# Patient Record
Sex: MALE | Race: WHITE | NOT HISPANIC OR LATINO | URBAN - METROPOLITAN AREA
[De-identification: names, ages, dates, MRNs, and addresses within clinical notes are randomized per-mention and may not be internally consistent; named-entity substitution may affect disease eponyms.]

---

## 2021-05-03 ENCOUNTER — EMERGENCY (EMERGENCY)
Facility: HOSPITAL | Age: 61
LOS: 0 days | Discharge: HOME | End: 2021-05-04
Attending: EMERGENCY MEDICINE | Admitting: EMERGENCY MEDICINE
Payer: COMMERCIAL

## 2021-05-03 VITALS
DIASTOLIC BLOOD PRESSURE: 81 MMHG | HEART RATE: 77 BPM | RESPIRATION RATE: 20 BRPM | OXYGEN SATURATION: 98 % | TEMPERATURE: 99 F | SYSTOLIC BLOOD PRESSURE: 131 MMHG

## 2021-05-03 DIAGNOSIS — R53.1 WEAKNESS: ICD-10-CM

## 2021-05-03 DIAGNOSIS — M19.90 UNSPECIFIED OSTEOARTHRITIS, UNSPECIFIED SITE: ICD-10-CM

## 2021-05-03 DIAGNOSIS — R20.2 PARESTHESIA OF SKIN: ICD-10-CM

## 2021-05-03 DIAGNOSIS — K21.9 GASTRO-ESOPHAGEAL REFLUX DISEASE WITHOUT ESOPHAGITIS: ICD-10-CM

## 2021-05-03 DIAGNOSIS — R47.82 FLUENCY DISORDER IN CONDITIONS CLASSIFIED ELSEWHERE: ICD-10-CM

## 2021-05-03 DIAGNOSIS — I10 ESSENTIAL (PRIMARY) HYPERTENSION: ICD-10-CM

## 2021-05-03 DIAGNOSIS — R42 DIZZINESS AND GIDDINESS: ICD-10-CM

## 2021-05-03 DIAGNOSIS — R55 SYNCOPE AND COLLAPSE: ICD-10-CM

## 2021-05-03 DIAGNOSIS — J98.4 OTHER DISORDERS OF LUNG: ICD-10-CM

## 2021-05-03 LAB
ALBUMIN SERPL ELPH-MCNC: 4.4 G/DL — SIGNIFICANT CHANGE UP (ref 3.5–5.2)
ALP SERPL-CCNC: 80 U/L — SIGNIFICANT CHANGE UP (ref 30–115)
ALT FLD-CCNC: 15 U/L — SIGNIFICANT CHANGE UP (ref 0–41)
ANION GAP SERPL CALC-SCNC: 8 MMOL/L — SIGNIFICANT CHANGE UP (ref 7–14)
APTT BLD: 31.2 SEC — SIGNIFICANT CHANGE UP (ref 27–39.2)
AST SERPL-CCNC: 22 U/L — SIGNIFICANT CHANGE UP (ref 0–41)
BASOPHILS # BLD AUTO: 0.03 K/UL — SIGNIFICANT CHANGE UP (ref 0–0.2)
BASOPHILS NFR BLD AUTO: 0.5 % — SIGNIFICANT CHANGE UP (ref 0–1)
BILIRUB SERPL-MCNC: 0.4 MG/DL — SIGNIFICANT CHANGE UP (ref 0.2–1.2)
BUN SERPL-MCNC: 17 MG/DL — SIGNIFICANT CHANGE UP (ref 10–20)
CALCIUM SERPL-MCNC: 9.6 MG/DL — SIGNIFICANT CHANGE UP (ref 8.5–10.1)
CHLORIDE SERPL-SCNC: 103 MMOL/L — SIGNIFICANT CHANGE UP (ref 98–110)
CO2 SERPL-SCNC: 26 MMOL/L — SIGNIFICANT CHANGE UP (ref 17–32)
CREAT SERPL-MCNC: 0.9 MG/DL — SIGNIFICANT CHANGE UP (ref 0.7–1.5)
EOSINOPHIL # BLD AUTO: 0.08 K/UL — SIGNIFICANT CHANGE UP (ref 0–0.7)
EOSINOPHIL NFR BLD AUTO: 1.2 % — SIGNIFICANT CHANGE UP (ref 0–8)
GLUCOSE SERPL-MCNC: 94 MG/DL — SIGNIFICANT CHANGE UP (ref 70–99)
HCT VFR BLD CALC: 39.6 % — LOW (ref 42–52)
HGB BLD-MCNC: 13.3 G/DL — LOW (ref 14–18)
IMM GRANULOCYTES NFR BLD AUTO: 0.2 % — SIGNIFICANT CHANGE UP (ref 0.1–0.3)
INR BLD: 1.01 RATIO — SIGNIFICANT CHANGE UP (ref 0.65–1.3)
LYMPHOCYTES # BLD AUTO: 1.83 K/UL — SIGNIFICANT CHANGE UP (ref 1.2–3.4)
LYMPHOCYTES # BLD AUTO: 27.9 % — SIGNIFICANT CHANGE UP (ref 20.5–51.1)
MAGNESIUM SERPL-MCNC: 2 MG/DL — SIGNIFICANT CHANGE UP (ref 1.8–2.4)
MCHC RBC-ENTMCNC: 31.2 PG — HIGH (ref 27–31)
MCHC RBC-ENTMCNC: 33.6 G/DL — SIGNIFICANT CHANGE UP (ref 32–37)
MCV RBC AUTO: 93 FL — SIGNIFICANT CHANGE UP (ref 80–94)
MONOCYTES # BLD AUTO: 0.71 K/UL — HIGH (ref 0.1–0.6)
MONOCYTES NFR BLD AUTO: 10.8 % — HIGH (ref 1.7–9.3)
NEUTROPHILS # BLD AUTO: 3.89 K/UL — SIGNIFICANT CHANGE UP (ref 1.4–6.5)
NEUTROPHILS NFR BLD AUTO: 59.4 % — SIGNIFICANT CHANGE UP (ref 42.2–75.2)
NRBC # BLD: 0 /100 WBCS — SIGNIFICANT CHANGE UP (ref 0–0)
PLATELET # BLD AUTO: 226 K/UL — SIGNIFICANT CHANGE UP (ref 130–400)
POTASSIUM SERPL-MCNC: 4.7 MMOL/L — SIGNIFICANT CHANGE UP (ref 3.5–5)
POTASSIUM SERPL-SCNC: 4.7 MMOL/L — SIGNIFICANT CHANGE UP (ref 3.5–5)
PROT SERPL-MCNC: 7.1 G/DL — SIGNIFICANT CHANGE UP (ref 6–8)
PROTHROM AB SERPL-ACNC: 11.6 SEC — SIGNIFICANT CHANGE UP (ref 9.95–12.87)
RBC # BLD: 4.26 M/UL — LOW (ref 4.7–6.1)
RBC # FLD: 11.5 % — SIGNIFICANT CHANGE UP (ref 11.5–14.5)
SODIUM SERPL-SCNC: 137 MMOL/L — SIGNIFICANT CHANGE UP (ref 135–146)
TROPONIN T SERPL-MCNC: <0.01 NG/ML — SIGNIFICANT CHANGE UP
WBC # BLD: 6.55 K/UL — SIGNIFICANT CHANGE UP (ref 4.8–10.8)
WBC # FLD AUTO: 6.55 K/UL — SIGNIFICANT CHANGE UP (ref 4.8–10.8)

## 2021-05-03 PROCEDURE — 99220: CPT

## 2021-05-03 PROCEDURE — 70496 CT ANGIOGRAPHY HEAD: CPT | Mod: 26,MA

## 2021-05-03 PROCEDURE — 93010 ELECTROCARDIOGRAM REPORT: CPT

## 2021-05-03 PROCEDURE — 70450 CT HEAD/BRAIN W/O DYE: CPT | Mod: 26,59,MA

## 2021-05-03 PROCEDURE — 70498 CT ANGIOGRAPHY NECK: CPT | Mod: 26,MA

## 2021-05-03 RX ORDER — METOCLOPRAMIDE HCL 10 MG
10 TABLET ORAL ONCE
Refills: 0 | Status: COMPLETED | OUTPATIENT
Start: 2021-05-03 | End: 2021-05-03

## 2021-05-03 RX ORDER — SODIUM CHLORIDE 9 MG/ML
1000 INJECTION, SOLUTION INTRAVENOUS ONCE
Refills: 0 | Status: COMPLETED | OUTPATIENT
Start: 2021-05-03 | End: 2021-05-03

## 2021-05-03 RX ADMIN — SODIUM CHLORIDE 1000 MILLILITER(S): 9 INJECTION, SOLUTION INTRAVENOUS at 18:55

## 2021-05-03 RX ADMIN — Medication 10 MILLIGRAM(S): at 22:37

## 2021-05-03 NOTE — ED PROVIDER NOTE - CARE PLAN
Principal Discharge DX:	Lightheadedness   Principal Discharge DX:	Lightheadedness  Secondary Diagnosis:	Syncope  Secondary Diagnosis:	Paresthesia  Secondary Diagnosis:	Fluency disorder associated with underlying disease

## 2021-05-03 NOTE — ED CDU PROVIDER INITIAL DAY NOTE - PHYSICAL EXAMINATION
PHYSICAL EXAM:    GENERAL: Alert, appears stated age, well appearing, non-toxic  SKIN: Warm, pink and dry. MMM.   HEAD: NC  EYE: Normal lids/conjunctiva  ENT: Normal hearing, patent oropharynx  NECK: +supple. No meningismus, or JVD.   Pulm: Bilateral BS, normal resp effort, no wheezes, stridor, or retractions  CV: RRR, no M/R/G, 2+and = radial pulses  Abd: soft, non-tender, non-distended  Mskel: no erythema, cyanosis, edema. no calf tenderness  Neuro: AAOx3, no sensory/motor deficits, CN 2-12 intact. No speech slurring, pronator drift, facial asymmetry. normal finger-to-nose b/l. 5/5 strength throughout. normal gait.

## 2021-05-03 NOTE — ED CDU PROVIDER INITIAL DAY NOTE - OBJECTIVE STATEMENT
59 y/o M with PMH HTN, chronic lung disease from 9/11, vertigo, OA placed in OBS under TIA protocol.   He presents with global weakness, constant moderate lightheadedness x 5 days. no palliating/provoking factors. at onset 5 days ago had difficulty speaking, chest pressure, tingling fingers, and feeling pre-syncopal no LOC.  Saw his doctor who just increased his lisinopril from 10 to 15 mg. +similar episode in past.   Has had worsening concentration/focus, worsening cognitive abilities (can't recall people he just talked to without a lot of prompting, can't figure things out he normally would quickly in past).  Meds Lisinopril 15 mg, Nexium  PMD Ribiero.

## 2021-05-03 NOTE — ED PROVIDER NOTE - ATTENDING CONTRIBUTION TO CARE
59 yo male with pmh of HTN, vertigo, chronic lung dz from 9/11, GERD, arthritis, right hip replacement, left shoulder surgery presents to the ER weakness and dizziness/lightheadedness since Thursday (5 days PTA). States on Thur he had an episode where he felt like he dazed out while emptying out some bottles, felt very lightheaded, tingling of hands/fingers, difficulty speaking, chest pressure and felt like he was going to pass out onto ground. However he never fell and episode passed. Saw his doctor who just increased his lisinopril from 10 to 15 mg. Two months prior he had he was feeling lightheaded, stood up and passed out hitting face against ground. Has had worsening concentration/focus, worsening cognitive abilities (can't recall people he just talked to without a lot of prompting, can't figure things out he normally would quickly in past), +nausea, but denies vomiting/diarrhea/fever/cough/SOB/abdomen pain/HA/leg pain or swelling/dysuria/hematuria/rectal bleeding (but in past did notice blood in stools).   Exam NCAT, EOMI, no facial droop, PERRL, no cspine tenderness, Lungs CTAB, Heart regular S1S2 Abdomen soft nt/nd +BS, no mass, Ext no edema or calf tenderness, No joint deformity, Neuro normal motor/sensory today, slightly off balance when walking, normal CN exam. no facial droop/slurred speech. Alert and oriented x 3.   Will check labs, ekg, xray, CT head, CTA head/neck.     ALL: nkda  PMH as above  Meds Lisinopril 15 mg, Nexium, Tylenol  SH denies smoking  PMD Enio

## 2021-05-03 NOTE — ED ADULT NURSE NOTE - OBJECTIVE STATEMENT
Pt. and daughter reports episode of blacking out and feeling dizzy and weak last week. He states he has not been the same since then and is forgetful. Denies cp, sob, n/v/d, fevers, chills.

## 2021-05-03 NOTE — ED PROVIDER NOTE - CLINICAL SUMMARY MEDICAL DECISION MAKING FREE TEXT BOX
61 yo male with pmh of HTN, vertigo, chronic lung dz from 9/11, GERD, arthritis, right hip replacement, left shoulder surgery presents to the ER weakness and dizziness/lightheadedness since Thursday (5 days PTA). States on Thur he had an episode where he felt like he dazed out while emptying out some bottles, felt very lightheaded, tingling of hands/fingers, difficulty speaking, chest pressure and felt like he was going to pass out onto ground. However he never fell and episode passed. Saw his doctor who just increased his lisinopril from 10 to 15 mg. Two months prior he had he was feeling lightheaded, stood up and passed out hitting face against ground. Has had worsening concentration/focus, worsening cognitive abilities (can't recall people he just talked to without a lot of prompting, can't figure things out he normally would quickly in past), +nausea, but denies vomiting/diarrhea/fever/cough/SOB/abdomen pain/HA/leg pain or swelling/dysuria/hematuria/rectal bleeding (but in past did notice blood in stools).   Exam NCAT, EOMI, no facial droop, PERRL, no cspine tenderness, Lungs CTAB, Heart regular S1S2 Abdomen soft nt/nd +BS, no mass, Ext no edema or calf tenderness, No joint deformity, Neuro normal motor/sensory today, slightly off balance when walking, normal CN exam. no facial droop/slurred speech. Alert and oriented x 3.  Pt needs neuro/cardio evaluation. Neuro consulted in the ER, recommend OBS for MRI as TIA workup. Also given his syncopal episode recommend ECHO heart while getting workup.

## 2021-05-03 NOTE — ED PROVIDER NOTE - NS ED ROS FT
Review of Systems:  	•	CONSTITUTIONAL: no fever, no diaphoresis, no chills  	•	SKIN: no rash  	•	EYES: no eye pain, no blurry vision  	•	ENT: no change in hearing, no tinnitus   	•	RESPIRATORY: no shortness of breath, no cough  	•	CARDIAC: no chest pain, no palpitations  	•	GI: no abd pain, +nausea, no vomiting, no diarrhea  	•	GENITO-URINARY: no discharge, no dysuria; no hematuria, no increased urinary frequency  	•	MUSCULOSKELETAL: no joint paint, no swelling, no redness  	•	NEUROLOGIC: +generalized weakness, +headache. no paresthesias/numbness/loc  	•	PSYCH: no anxiety, non suicidal, non homicidal, no hallucination, no depression

## 2021-05-03 NOTE — ED PROVIDER NOTE - PROGRESS NOTE DETAILS
neo: neuro aware, spoke with keeley VANEGAS, who will evaluate patient in ED. pending CT results. per neuro- obs for MRI

## 2021-05-03 NOTE — ED PROVIDER NOTE - PHYSICAL EXAMINATION
CONSTITUTIONAL: Well-developed; well-nourished; in no acute distress, nontoxic appearing  SKIN: skin exam is warm and dry  HEAD: Normocephalic; atraumatic.  EYES: PERRL, 3 mm bilateral, no nystagmus, EOM intact; conjunctiva and sclera clear.  ENT: MMM  NECK: ROM intact.  CARD: S1, S2 normal, no murmur  RESP: No wheezes, rales or rhonchi. Good air movement bilaterally  ABD: soft; non-distended; non-tender. No Rebound, No guarding  EXT: Normal ROM.   NEURO: awake, alert, following commands, oriented, grossly unremarkable. No Focal deficits. GCS 15. Negative pronator drift. CN 2-12 intact.  PSYCH: Cooperative, appropriate.

## 2021-05-03 NOTE — ED PROVIDER NOTE - OBJECTIVE STATEMENT
60 year old male, past medical history HTN, GERD, who presents with lightheadedness. patient reports feeling lightheadedness, presyncopal symptoms that began x4 days ago, patient also endorses episode of inability finding words, palpitations and paresthesias that occurred x3 days ago, episode lasting ~10 minutes. Patient reports since incident has been feeling lightheadedness, unsteady gait. denies vision changes, neck pain, chest pain, SOB, abd pain, vomiting, diarrhea, urinary symptoms, skin changes. denies hx similar.

## 2021-05-04 VITALS
SYSTOLIC BLOOD PRESSURE: 146 MMHG | OXYGEN SATURATION: 99 % | DIASTOLIC BLOOD PRESSURE: 87 MMHG | HEART RATE: 71 BPM | RESPIRATION RATE: 18 BRPM | TEMPERATURE: 98 F

## 2021-05-04 LAB
SARS-COV-2 RNA SPEC QL NAA+PROBE: SIGNIFICANT CHANGE UP
TROPONIN T SERPL-MCNC: <0.01 NG/ML — SIGNIFICANT CHANGE UP

## 2021-05-04 PROCEDURE — 93010 ELECTROCARDIOGRAM REPORT: CPT

## 2021-05-04 PROCEDURE — 93306 TTE W/DOPPLER COMPLETE: CPT | Mod: 26

## 2021-05-04 PROCEDURE — 70551 MRI BRAIN STEM W/O DYE: CPT | Mod: 26,MA

## 2021-05-04 PROCEDURE — 99283 EMERGENCY DEPT VISIT LOW MDM: CPT

## 2021-05-04 PROCEDURE — 99217: CPT

## 2021-05-04 NOTE — CONSULT NOTE ADULT - SUBJECTIVE AND OBJECTIVE BOX
Stroke Consult Note:    JEFF GOODWIN    1. Chief Complaint: lightheadedness /unsteady gait    HPI: 59 y/o M with PMH HTN, chronic lung disease from ,  OA presents after 5 day history of episodic dizziness lightheadedness and unsteady gait. On thursday he said episode of  , BL UE weakness and difficulty expressing himself which lasted for about 10 minutes and resolved. EMS were called but patient refused to come to the hospital. After that event patient has been complaining of lightheadidness and unsteady gait and episodes of "spacing out" as per daughter.  On exam he has diminished sensation on the right side, other wise non focal. CTH and CTA were done in ED which are unremarkable.     	    2. Relevant PMH:   Prior ischemic stroke/TIA[ ], Afib [ ], CAD [ ], HTN [ x], DLD [ ], DM [ ], PVD [ ], Obesity [ ],   Sedentary lifestyle [ ], CHF [ ], IMAN [ ], Cancer Hx [ ].    3. Social History: Smoking [ ], Drug Use [ ], Alcohol Use [ ], Other [ ]    4. Possible Location of Stroke:    5. Relevant Brain Tissue Imagin. Relevant Cerebrovascular Imaging:   CT Angio Neck w/ IV Cont:   EXAM:  CT ANGIO NECK (W)AW IC        EXAM:  CT ANGIO BRAIN (W)AW IC            PROCEDURE DATE:  2021            INTERPRETATION:  Clinical History / Reason for exam: Weakness/confusion    Technique/CT Angiogram of the head and neck: Axial contiguous images were obtained of the head and neck following the intravenous administration of contrast. Reformatted images in the coronal and sagittal planes were acquired, as well as 3DMIP reconstructed images.    Comparison:    Findings:    NECK:    The visualized aortic arch and great vessel origins are patent. There is no stenosis at the origin of the right brachiocephalic, right and left common carotid, left subclavian, and right and left vertebral arteries. The left common carotid artery arises from a common trunk with the right brachiocephalic artery (anatomic variation).    The right and left common, internal and external carotid arteries are patent. There is mild calcification but no stenosis at the common carotid artery bifurcations.There is no evidence of significant stenosis or occlusion of the common carotid arteries, the carotid artery bifurcations, or along the course of the distal cervical portions of the right and left internal carotid arteries.    Normal branching pattern is noted of the bilateral external carotid arteries.    The vertebral arteries are patent.    HEAD:    The distal right and left internal carotid arteries are patent without calcification or stenosis in the region of the cavernous and supraclinoid portions of the ICAs.    The ophthalmic arteries are patent.    There is normal contrast filling of the middle cerebral arteries and the left anterior cerebral artery.  The A1 segment of the right anterior cerebral artery is hypoplastic with both distal anterior cerebral arteries are normally; the anterior communicating artery is patent (anatomic variation).  No evidence of stenosis, occlusion or aneurysm.    The distal vertebral arteries are patent.The basilar artery is patent. There is normal filling of the PCAs, SCAs, PICAs and AICAs bilaterally. No evidence of stenosis, occlusion or aneurysm.      No venous abnormalities are noted. No evidence of filling defects within the venous sinuses.    OTHER: The lung apices are clear.    IMPRESSION:    Negative CTA of the head and neck  No evidence of major vascular stenosis, occlusion, or aneurysm.        MALIHA VALDEZ MD; Attending Interventional Radiologist  This document has been electronically signed. May  3 2021 10:57PM (21 @ 22:07)     CT Angio Head w/ IV Cont:   EXAM:  CT ANGIO NECK (W)AW IC        EXAM:  CT ANGIO BRAIN (W)AW IC            PROCEDURE DATE:  2021            INTERPRETATION:  Clinical History / Reason for exam: Weakness/confusion    Technique/CT Angiogram of the head and neck: Axial contiguous images were obtained of the head and neck following the intravenous administration of contrast. Reformatted images in the coronal and sagittal planes were acquired, as well as 3DMIP reconstructed images.    Comparison:    Findings:    NECK:    The visualized aortic arch and great vessel origins are patent. There is no stenosis at the origin of the right brachiocephalic, right and left common carotid, left subclavian, and right and left vertebral arteries. The left common carotid artery arises from a common trunk with the right brachiocephalic artery (anatomic variation).    The right and left common, internal and external carotid arteries are patent. There is mild calcification but no stenosis at the common carotid artery bifurcations.There is no evidence of significant stenosis or occlusion of the common carotid arteries, the carotid artery bifurcations, or along the course of the distal cervical portions of the right and left internal carotid arteries.    Normal branching pattern is noted of the bilateral external carotid arteries.    The vertebral arteries are patent.    HEAD:    The distal right and left internal carotid arteries are patent without calcification or stenosis in the region of the cavernous and supraclinoid portions of the ICAs.    The ophthalmic arteries are patent.    There is normal contrast filling of the middle cerebral arteries and the left anterior cerebral artery.  The A1 segment of the right anterior cerebral artery is hypoplastic with both distal anterior cerebral arteries are normally; the anterior communicating artery is patent (anatomic variation).  No evidence of stenosis, occlusion or aneurysm.    The distal vertebral arteries are patent.The basilar artery is patent. There is normal filling of the PCAs, SCAs, PICAs and AICAs bilaterally. No evidence of stenosis, occlusion or aneurysm.      No venous abnormalities are noted. No evidence of filling defects within the venous sinuses.    OTHER: The lung apices are clear.    IMPRESSION:    Negative CTA of the head and neck  No evidence of major vascular stenosis, occlusion, or aneurysm.              MALIHA VALDEZ MD; Attending Interventional Radiologist  This document has been electronically signed. May  3 2021 10:57PM (21 @ 22:06)            7. Relevant blood tests:      8. Relevant cardiac rhythm monitorin. Relevant Cardiac Structure: (TTE/BARBARA +/-):[ ]No intracardiac thrombus/[ ] no vegetation/[ ]no [place]/EF:    Home Medications:      MEDICATIONS  (STANDING):      10. PT/OT/Speech/Rehab/S&Sw/ Cognitive eval results and recommendations:    11. Exam:    Vital Signs Last 24 Hrs  T(C): 37.1 (03 May 2021 18:07), Max: 37.1 (03 May 2021 18:07)  T(F): 98.7 (03 May 2021 18:07), Max: 98.7 (03 May 2021 18:07)  HR: 69 (04 May 2021 02:43) (62 - 77)  BP: 182/86 (04 May 2021 02:43) (131/81 - 182/86)  BP(mean): --  RR: 18 (04 May 2021 02:43) (18 - 20)  SpO2: 100% (04 May 2021 02:43) (98% - 100%)    12.   NIH STROKE SCALE  Item	                                                        Score  1 a.	Level of Consciousness	               	0  1 b. LOC Questions	                                0  1 c.	LOC Commands	                               	0  2.	Best Gaze	                                        0  3.	Visual	                                                0  4.	Facial Palsy	                                        0  5 a.	Motor Arm - Left	                                0  5 b.	Motor Arm - Right	                        0  6 a.	Motor Leg - Left	                                0  6 b.	Motor Leg - Right	                                0  7.	Limb Ataxia	                                        0  8.	Sensory	                                                1  9.	Language	                                        0  10.	Dysarthria	                                        0  11.	Extinction and Inattention  	        0  ______________________________________  TOTAL	                                                        0    Total NIHSS on admission:      NIHSS yesterday:      NIHSS today: 1    mRS:  0 No symptoms at all  1 No significant disability despite symptoms; able to carry out all usual duties and activities without assistance  2 Slight disability; unable to carry out all previous activities, but able to look after own affairs  3 Moderate disability; requiring some help, but able to walk without assistance  4 Moderately severe disability; unable to walk without assistance and unable to attend to own bodily needs without assistance  5 Severe disability; bedridden, incontinent and requiring constant nursing care and attention  6 Dead      13. Impression: 59 y/o M with PMH HTN, chronic lung disease from ,  OA presents after 5 day history of episodic dizziness lightheadedness and unsteady gait. On thursday he said episode of  , BL UE weakness and difficulty expressing himself which lasted for about 10 minutes and resolved. EMS were called but patient refused to come to the hospital. After that event patient has been complaining of lightheadedness and unsteady gait and episodes of "spacing out" as per daughter.  On exam he has diminished sensation on the right side, other wise non focal. CTH and CTA were done in ED which are unremarkable. Labs are unremarkable.  SS could be related to TIA vs seizure vs presyncopy..      15. Suggestions:   MRI brain NC  Echo  Telemetry        Neuroattending note will follow

## 2021-05-04 NOTE — ED CDU PROVIDER DISPOSITION NOTE - CLINICAL COURSE
59 Y/O M HTN, RECENTLY STARTED ON LISINOPRIL BY PMD WITH 2 EPISODES OF NEAR SYNCOPE ASSOCIATED WITH PARESTHESIAS AND DIFFICULTY SPEAKING. INITIAL ED EVALUATION REVIEWED. CT SCANS REVIEWED. NEUROLOGY CONSULT REVIEWED. PT WITH NO COMPLAINTS. NO DIZZINESS, CP, SOB. NO HA N/V. EXAM NORMAL. MRI BRAIN AND EEG NORMAL. PT TO FOLLOW UP WITH NEUROLOGY AND PMD AS AN OUTPT. PT GIVEN STRICT RETURN INSTRUCTIONS.

## 2021-05-04 NOTE — ED CDU PROVIDER DISPOSITION NOTE - NSFOLLOWUPINSTRUCTIONS_ED_ALL_ED_FT
Follow up with your PMD in 1 week  Continue all your home medications  Return to ED if your symptoms worsen/return

## 2021-05-04 NOTE — ED CDU PROVIDER DISPOSITION NOTE - CARE PROVIDER_API CALL
CONSUELO VARGAS  [id number]  [address], Phys,    Phone: [phone]  Fax: [phone]  Follow Up Time: 4-6 Days

## 2021-05-04 NOTE — ED CDU PROVIDER SUBSEQUENT DAY NOTE - MEDICAL DECISION MAKING DETAILS
59 Y/O M HTN, RECENTLY STARTED ON LISINOPRIL BY PMD WITH 2 EPISODES OF NEAR SYNCOPE ASSOCIATED WITH PARESTHESIAS AND DIFFICULTY SPEAKING. INITIAL ED EVALUATION REVIEWED. CT SCANS REVIEWED. NEUROLOGY CONSULT REVIEWED. PT AWAITING MRI BRAIN AND ECHO THIS AM. PT WITH NO COMPLAINTS. NO DIZZINESS, CP, SOB. NO HA N/V. EXAM NORMAL. WILL REASSESS.

## 2021-05-04 NOTE — ED CDU PROVIDER DISPOSITION NOTE - NSFOLLOWUPCLINICS_GEN_ALL_ED_FT
[facility]  Neurology  [address] [place] [address], [address]  [place] [address]  Phone: [phone]  Fax:

## 2021-05-04 NOTE — CONSULT NOTE ADULT - ATTENDING COMMENTS
Patient seen and examined and agree with above except as noted.  Patients history, imaging, vitals, meds and notes reviewed personally.  Patient had multiple episodes over the last few weeks of syncope some associated with coughing violently and some with positional changes, including waking up in night to use bathroom and passing out waking up on floor.  This latest episode also is suspicious for orthostatic hypotensive episode.  He also has some findings on exam suspicious for neuropathy with absent vibration in patellar and ankle and decreased LT, Temp in LE b/l.  Unclear etiology for neuropathy but in setting of possible vasovagal and orthostatic events may suggest autonomic instability.    Plan   1. Send SPEP and UPEP both with immunofixation, hgba1c, JODI, dsDNA  2. Out patient EMG/NCS  3. Cardiology evaluation for tilt table   4. Neurology follow up as an out patient

## 2021-05-04 NOTE — ED CDU PROVIDER DISPOSITION NOTE - PATIENT PORTAL LINK FT
You can access the FollowMyHealth Patient Portal offered by [facility] by registering at the following website: [email or web address]. By joining [facility]’s FollowMyHealth portal, you will also be able to view your health information using other applications (apps) compatible with our system.

## 2021-05-04 NOTE — ED CDU PROVIDER SUBSEQUENT DAY NOTE - ATTENDING CONTRIBUTION TO CARE
I personally evaluated the patient. I reviewed the Resident’s or Physician Assistant’s note (as assigned above), and agree with the findings and plan except as documented in my note.   59 Y/O M HTN, RECENTLY STARTED ON LISINOPRIL BY PMD WITH 2 EPISODES OF NEAR SYNCOPE ASSOCIATED WITH PARESTHESIAS AND DIFFICULTY SPEAKING. INITIAL ED EVALUATION REVIEWED. CT SCANS REVIEWED. NEUROLOGY CONSULT REVIEWED. PT AWAITING MRI BRAIN AND ECHO THIS AM. PT WITH NO COMPLAINTS. NO DIZZINESS, CP, SOB. NO HA N/V. EXAM NORMAL. WILL REASSESS.

## 2021-05-04 NOTE — ED CDU PROVIDER DISPOSITION NOTE - ATTENDING CONTRIBUTION TO CARE
I personally evaluated the patient. I reviewed the Resident’s or Physician Assistant’s note (as assigned above), and agree with the findings and plan except as documented in my note.   59 Y/O M HTN, RECENTLY STARTED ON LISINOPRIL BY PMD WITH 2 EPISODES OF NEAR SYNCOPE ASSOCIATED WITH PARESTHESIAS AND DIFFICULTY SPEAKING. INITIAL ED EVALUATION REVIEWED. CT SCANS REVIEWED. NEUROLOGY CONSULT REVIEWED. PT WITH NO COMPLAINTS. NO DIZZINESS, CP, SOB. NO HA N/V. EXAM NORMAL. MRI BRAIN AND EEG NORMAL. PT TO FOLLOW UP WITH NEUROLOGY AND PMD AS AN OUTPT. PT GIVEN STRICT RETURN INSTRUCTIONS.

## 2021-05-04 NOTE — ED CDU PROVIDER SUBSEQUENT DAY NOTE - PROGRESS NOTE DETAILS
Pt seen at bedside, NAD. Arrived overnight, received from BHUPENDRA Whitlock presenting under TIA protocol. Pt seen at bedside, NAD. Arrived overnight, received from BHUPENDRA Whitlock presenting under TIA protocol. Pt complained of of weakness, lightheadedness. Also complained of having episode of difficulty speaking/concentrating and other cognitive abilities. Pt was seen by neuro who recommend MRI and ECHO. CTA H/N - negative. Covid Negative. Pt currently awaiting results for ECHO

## 2021-05-04 NOTE — ED ADULT NURSE REASSESSMENT NOTE - NS ED NURSE REASSESS COMMENT FT1
patient placed in observation and moved to 16B.  Patient remains on cardiac monitor. Patient in stable condition and nad.  Report given to NICO Manley.

## 2021-07-31 ENCOUNTER — INPATIENT (INPATIENT)
Facility: HOSPITAL | Age: 61
LOS: 3 days | Discharge: HOME | End: 2021-08-04
Attending: INTERNAL MEDICINE | Admitting: INTERNAL MEDICINE
Payer: COMMERCIAL

## 2021-07-31 VITALS
RESPIRATION RATE: 30 BRPM | WEIGHT: 210.1 LBS | SYSTOLIC BLOOD PRESSURE: 99 MMHG | TEMPERATURE: 98 F | HEIGHT: 75 IN | HEART RATE: 67 BPM | DIASTOLIC BLOOD PRESSURE: 55 MMHG | OXYGEN SATURATION: 98 %

## 2021-07-31 PROBLEM — I10 ESSENTIAL (PRIMARY) HYPERTENSION: Chronic | Status: ACTIVE | Noted: 2021-05-03

## 2021-07-31 LAB
ALBUMIN SERPL ELPH-MCNC: 4.5 G/DL — SIGNIFICANT CHANGE UP (ref 3.5–5.2)
ALP SERPL-CCNC: 90 U/L — SIGNIFICANT CHANGE UP (ref 30–115)
ALT FLD-CCNC: 14 U/L — SIGNIFICANT CHANGE UP (ref 0–41)
ANION GAP SERPL CALC-SCNC: 7 MMOL/L — SIGNIFICANT CHANGE UP (ref 7–14)
APTT BLD: 27.2 SEC — SIGNIFICANT CHANGE UP (ref 27–39.2)
AST SERPL-CCNC: 18 U/L — SIGNIFICANT CHANGE UP (ref 0–41)
BASOPHILS # BLD AUTO: 0.04 K/UL — SIGNIFICANT CHANGE UP (ref 0–0.2)
BASOPHILS NFR BLD AUTO: 0.4 % — SIGNIFICANT CHANGE UP (ref 0–1)
BILIRUB SERPL-MCNC: 0.4 MG/DL — SIGNIFICANT CHANGE UP (ref 0.2–1.2)
BLD GP AB SCN SERPL QL: SIGNIFICANT CHANGE UP
BUN SERPL-MCNC: 19 MG/DL — SIGNIFICANT CHANGE UP (ref 10–20)
CALCIUM SERPL-MCNC: 9.3 MG/DL — SIGNIFICANT CHANGE UP (ref 8.5–10.1)
CHLORIDE SERPL-SCNC: 101 MMOL/L — SIGNIFICANT CHANGE UP (ref 98–110)
CO2 SERPL-SCNC: 29 MMOL/L — SIGNIFICANT CHANGE UP (ref 17–32)
CREAT SERPL-MCNC: 1.1 MG/DL — SIGNIFICANT CHANGE UP (ref 0.7–1.5)
EOSINOPHIL # BLD AUTO: 0.09 K/UL — SIGNIFICANT CHANGE UP (ref 0–0.7)
EOSINOPHIL NFR BLD AUTO: 0.9 % — SIGNIFICANT CHANGE UP (ref 0–8)
GLUCOSE SERPL-MCNC: 113 MG/DL — HIGH (ref 70–99)
HCT VFR BLD CALC: 38.7 % — LOW (ref 42–52)
HGB BLD-MCNC: 12.9 G/DL — LOW (ref 14–18)
IMM GRANULOCYTES NFR BLD AUTO: 0.4 % — HIGH (ref 0.1–0.3)
INR BLD: 1.02 RATIO — SIGNIFICANT CHANGE UP (ref 0.65–1.3)
LACTATE SERPL-SCNC: 1.2 MMOL/L — SIGNIFICANT CHANGE UP (ref 0.7–2)
LIDOCAIN IGE QN: 24 U/L — SIGNIFICANT CHANGE UP (ref 7–60)
LYMPHOCYTES # BLD AUTO: 1.12 K/UL — LOW (ref 1.2–3.4)
LYMPHOCYTES # BLD AUTO: 10.9 % — LOW (ref 20.5–51.1)
MAGNESIUM SERPL-MCNC: 2.1 MG/DL — SIGNIFICANT CHANGE UP (ref 1.8–2.4)
MCHC RBC-ENTMCNC: 31 PG — SIGNIFICANT CHANGE UP (ref 27–31)
MCHC RBC-ENTMCNC: 33.3 G/DL — SIGNIFICANT CHANGE UP (ref 32–37)
MCV RBC AUTO: 93 FL — SIGNIFICANT CHANGE UP (ref 80–94)
MONOCYTES # BLD AUTO: 0.8 K/UL — HIGH (ref 0.1–0.6)
MONOCYTES NFR BLD AUTO: 7.8 % — SIGNIFICANT CHANGE UP (ref 1.7–9.3)
NEUTROPHILS # BLD AUTO: 8.14 K/UL — HIGH (ref 1.4–6.5)
NEUTROPHILS NFR BLD AUTO: 79.6 % — HIGH (ref 42.2–75.2)
NRBC # BLD: 0 /100 WBCS — SIGNIFICANT CHANGE UP (ref 0–0)
PLATELET # BLD AUTO: 258 K/UL — SIGNIFICANT CHANGE UP (ref 130–400)
POTASSIUM SERPL-MCNC: 4.5 MMOL/L — SIGNIFICANT CHANGE UP (ref 3.5–5)
POTASSIUM SERPL-SCNC: 4.5 MMOL/L — SIGNIFICANT CHANGE UP (ref 3.5–5)
PROT SERPL-MCNC: 6.9 G/DL — SIGNIFICANT CHANGE UP (ref 6–8)
PROTHROM AB SERPL-ACNC: 11.7 SEC — SIGNIFICANT CHANGE UP (ref 9.95–12.87)
RBC # BLD: 4.16 M/UL — LOW (ref 4.7–6.1)
RBC # FLD: 11.5 % — SIGNIFICANT CHANGE UP (ref 11.5–14.5)
SARS-COV-2 RNA SPEC QL NAA+PROBE: SIGNIFICANT CHANGE UP
SODIUM SERPL-SCNC: 137 MMOL/L — SIGNIFICANT CHANGE UP (ref 135–146)
TROPONIN T SERPL-MCNC: <0.01 NG/ML — SIGNIFICANT CHANGE UP
WBC # BLD: 10.23 K/UL — SIGNIFICANT CHANGE UP (ref 4.8–10.8)
WBC # FLD AUTO: 10.23 K/UL — SIGNIFICANT CHANGE UP (ref 4.8–10.8)

## 2021-07-31 PROCEDURE — 70450 CT HEAD/BRAIN W/O DYE: CPT | Mod: 26,MA

## 2021-07-31 PROCEDURE — 99223 1ST HOSP IP/OBS HIGH 75: CPT

## 2021-07-31 PROCEDURE — 99223 1ST HOSP IP/OBS HIGH 75: CPT | Mod: 57

## 2021-07-31 PROCEDURE — 99285 EMERGENCY DEPT VISIT HI MDM: CPT

## 2021-07-31 PROCEDURE — 93010 ELECTROCARDIOGRAM REPORT: CPT | Mod: 76

## 2021-07-31 PROCEDURE — 71045 X-RAY EXAM CHEST 1 VIEW: CPT | Mod: 26

## 2021-07-31 RX ORDER — OMEPRAZOLE 10 MG/1
1 CAPSULE, DELAYED RELEASE ORAL
Qty: 0 | Refills: 0 | DISCHARGE

## 2021-07-31 RX ORDER — ATORVASTATIN CALCIUM 80 MG/1
10 TABLET, FILM COATED ORAL AT BEDTIME
Refills: 0 | Status: DISCONTINUED | OUTPATIENT
Start: 2021-07-31 | End: 2021-08-04

## 2021-07-31 RX ORDER — ATORVASTATIN CALCIUM 80 MG/1
1 TABLET, FILM COATED ORAL
Qty: 0 | Refills: 0 | DISCHARGE

## 2021-07-31 RX ORDER — ASPIRIN/CALCIUM CARB/MAGNESIUM 324 MG
1 TABLET ORAL
Qty: 0 | Refills: 0 | DISCHARGE

## 2021-07-31 RX ORDER — PANTOPRAZOLE SODIUM 20 MG/1
40 TABLET, DELAYED RELEASE ORAL
Refills: 0 | Status: DISCONTINUED | OUTPATIENT
Start: 2021-07-31 | End: 2021-08-04

## 2021-07-31 RX ORDER — ASPIRIN/CALCIUM CARB/MAGNESIUM 324 MG
81 TABLET ORAL DAILY
Refills: 0 | Status: DISCONTINUED | OUTPATIENT
Start: 2021-07-31 | End: 2021-08-04

## 2021-07-31 RX ADMIN — ATORVASTATIN CALCIUM 10 MILLIGRAM(S): 80 TABLET, FILM COATED ORAL at 21:46

## 2021-07-31 NOTE — ED PROVIDER NOTE - PROGRESS NOTE DETAILS
TD: Received signout from BHUPENDRA De La Fuente. CT head prelim negative. Pt reassessed, found sleeping comfortably in bed. States he has had two previous episodes of syncope incl. one 2mo. ago where he had minor neuro findings, had stroke workup and was dx TIA. Pt currently feels fairly well, mild pain to R side of body from falling but nothing severe, no chest pain, no SOB, no abd pain/n/v, no numbness/weakness/tingling anywhere, no confusion. Discussed admission, pt agreeable. TD: Called KEYSHA Charles to s/o pt for admission to tele bed. Informed that he is busy at the moment attending to various logistical issues with the inpatient teams but he will call me back at [phone] when finished within 30min. Will s/o at that time.

## 2021-07-31 NOTE — PATIENT PROFILE ADULT - MONEY FOR FOOD
Left message for patient advising them of results and recommendations below per Dr. Love.  Advised patient to call the office at [phone] if they have any questions.   Provided patient with scheduling information.     no

## 2021-07-31 NOTE — CONSULT NOTE ADULT - SUBJECTIVE AND OBJECTIVE BOX
Patient is a 60y old  Male who presents with a chief complaint of syncope (31 Jul 2021 12:57)        HPI:  60 year old male with hx of recently diagnosed HTN, HLD, chronic cough since being involved in 9/11, GERD presents acutely with an episode of syncope with generalized weakness, decreased PO intake, black stools, and nausea of one month's duration.  Patient states he went to get a glass of water last night and the last thing he remembers is being woken up by his daughter on the ground.  His daughter heard him fall and woke him up immediately.  He thinks he fell on his right side as that's where he's feeling pain.  Of note the EMS told him his blood pressure was 77/40 when he came, his family told him he was sweating.  He was recently diagnosed with hypertension and started on lisinopril after having lower extremity edema with amlodipine.    Patient has also been endorsing nausea for at least a month's duration along with early satiety.  He had an EGD (for chronic cough) 3 years ago and was diagnosed with GERD, he had a colonoscopy at the same time and was told it was clear with benign polyps.  He has been endorsing formed black stools for at least a month's duration as well and has not followed up with GI since, because of his decreased appetite he lost about 7 pounds in that time.    He currently denies chest pain, denies abdominal pain, denies diarrhea.    Triage vitals: BP 99/55  HR 67  RR 20  Temp 97.8  SpO2 98% on room air (31 Jul 2021 12:57)      Electrophysiology:  60y Male with recently    REVIEW OF SYSTEMS    [ ] A ten-point review of systems was otherwise negative except as noted.  [ ] Due to altered mental status/intubation, subjective information were not able to be obtained from the patient. History was obtained, to the extent possible, from review of the chart and collateral sources of information.      PAST MEDICAL & SURGICAL HISTORY:  HTN (hypertension)    Hyperlipidemia    No significant past surgical history        Home Medications:  aspirin 81 mg oral tablet, chewable: 1 tab(s) orally once a day (31 Jul 2021 12:56)  atorvastatin 10 mg oral tablet: 1 tab(s) orally once a day (31 Jul 2021 12:56)  lisinopril 10 mg oral tablet: 1 tab(s) orally once a day (31 Jul 2021 12:55)  lisinopril 2.5 mg oral tablet: 1 tab(s) orally once a day (31 Jul 2021 12:55)  omeprazole 40 mg oral delayed release capsule: 1 cap(s) orally once a day (31 Jul 2021 12:56)      Allergies:  No Known Allergies      FAMILY HISTORY:  Family history of hypertension (Father)    FH: lung cancer (Father)        SOCIAL HISTORY:    CIGARETTES:  ALCOHOL:        PREVIOUS DIAGNOSTIC TESTING:      ECHO  FINDINGS:    STRESS  FINDINGS:    CATHETERIZATION  FINDINGS:    ELECTROPHYSIOLOGY STUDY  FINDINGS:    CAROTID ULTRASOUND:  FINDINGS    VENOUS DUPLEX SCAN:  FINDINGS:    CHEST CT PULMONARY ANGIO with IV Contrast:  FINDINGS:      MEDICATIONS  (STANDING):  aspirin  chewable 81 milliGRAM(s) Oral daily  atorvastatin 10 milliGRAM(s) Oral at bedtime  pantoprazole    Tablet 40 milliGRAM(s) Oral before breakfast    MEDICATIONS  (PRN):      Vital Signs Last 24 Hrs  T(C): 36.1 (31 Jul 2021 14:15), Max: 36.6 (31 Jul 2021 01:00)  T(F): 96.9 (31 Jul 2021 14:15), Max: 97.8 (31 Jul 2021 01:00)  HR: 60 (31 Jul 2021 09:32) (56 - 67)  BP: 149/70 (31 Jul 2021 09:32) (99/55 - 149/70)  BP(mean): --  RR: 18 (31 Jul 2021 14:15) (18 - 20)  SpO2: 98% (31 Jul 2021 08:13) (98% - 98%)    PHYSICAL EXAM:    GENERAL: In no apparent distress, well nourished, and hydrated.  HEAD:  Atraumatic, Normocephalic  EYES: EOMI, PERRLA, conjunctiva and sclera clear  NECK: Supple and normal thyroid.  No JVD or carotid bruit.  Carotid pulse is 2+ bilaterally.  HEART: Regular rate and rhythm; No murmurs, rubs, or gallops.  PULMONARY: Clear to auscultation and perfusion.  No rales, wheezing, or rhonchi bilaterally.  ABDOMEN: Soft, Nontender, Nondistended; Bowel sounds present  EXTREMITIES:  2+ Peripheral Pulses, No clubbing, cyanosis, or edema  NEUROLOGICAL: Grossly nonfocal    I&O's Detail    Daily Height in cm: 190.5 (31 Jul 2021 09:32)    Daily     INTERPRETATION OF TELEMETRY:    ECG:        LABS:                        12.9   10.23 )-----------( 258      ( 31 Jul 2021 03:29 )             38.7     07-31    137  |  101  |  19  ----------------------------<  113<H>  4.5   |  29  |  1.1    Ca    9.3      31 Jul 2021 03:29  Mg     2.1     07-31    TPro  6.9  /  Alb  4.5  /  TBili  0.4  /  DBili  x   /  AST  18  /  ALT  14  /  AlkPhos  90  07-31    CARDIAC MARKERS ( 31 Jul 2021 03:29 )  x     / <0.01 ng/mL / x     / x     / x          PT/INR - ( 31 Jul 2021 03:29 )   PT: 11.70 sec;   INR: 1.02 ratio         PTT - ( 31 Jul 2021 03:29 )  PTT:27.2 sec    BNP            RADIOLOGY & ADDITIONAL STUDIES:       Patient is a 60y old  Male who presents with a chief complaint of syncope (31 Jul 2021 12:57)        HPI:  60 year old male with hx of recently diagnosed HTN, HLD, chronic cough since being involved in 9/11, GERD presents acutely with an episode of syncope with generalized weakness, decreased PO intake, black stools, and nausea of one month's duration.  Patient states he went to get a glass of water last night and the last thing he remembers is being woken up by his daughter on the ground.  His daughter heard him fall and woke him up immediately.  He thinks he fell on his right side as that's where he's feeling pain.  Of note the EMS told him his blood pressure was 77/40 when he came, his family told him he was sweating.  He was recently diagnosed with hypertension and started on lisinopril after having lower extremity edema with amlodipine.    Patient has also been endorsing nausea for at least a month's duration along with early satiety.  He had an EGD (for chronic cough) 3 years ago and was diagnosed with GERD, he had a colonoscopy at the same time and was told it was clear with benign polyps.  He has been endorsing formed black stools for at least a month's duration as well and has not followed up with GI since, because of his decreased appetite he lost about 7 pounds in that time.    He currently denies chest pain, denies abdominal pain, denies diarrhea.    Triage vitals: BP 99/55  HR 67  RR 20  Temp 97.8  SpO2 98% on room air (31 Jul 2021 12:57)      Electrophysiology:  60y Male with GERD, HLD, recently diagnosed with HTN ~6 months ago and started on Lisinopril HCTZ 10/12.5mg daily, was doing well until several months ago when had syncopal episode 5/3 and was admitted to [facility] ED neuro work up at that time was negative (see below). was recommended to follow up with cardiology. Was seen by Dr Nicolas and scheduled for stress test next month. Yesterday patient had another syncopal episode, got up from bed to walk to the kitchen, and was found down by family. By EMS found to be hypotensive 77/40 and diaphoretic. Patient reports poor PO intake, drinks about 3-4 cups of water per day, decrease food intake due to GERD and nausea.    REVIEW OF SYSTEMS    [x ] A ten-point review of systems was otherwise negative except as noted.  [ ] Due to altered mental status/intubation, subjective information were not able to be obtained from the patient. History was obtained, to the extent possible, from review of the chart and collateral sources of information.      PAST MEDICAL & SURGICAL HISTORY:  HTN (hypertension)    Hyperlipidemia    No significant past surgical history        Home Medications:  aspirin 81 mg oral tablet, chewable: 1 tab(s) orally once a day (31 Jul 2021 12:56)  atorvastatin 10 mg oral tablet: 1 tab(s) orally once a day (31 Jul 2021 12:56)  **lisinopril HCTZ 10 mg /12.5 mg oral tablet: 1 tab(s) orally once a day (31 Jul 2021 12:55)  omeprazole 40 mg oral delayed release capsule: 1 cap(s) orally once a day (31 Jul 2021 12:56)      Allergies:  No Known Allergies      FAMILY HISTORY:  Family history of hypertension (Father)    FH: lung cancer (Father)        SOCIAL HISTORY: denies tobacco / ETOH / illicit drug use     CIGARETTES:  ALCOHOL: socail        PREVIOUS DIAGNOSTIC TESTING:      ECHO  FINDINGS:  < from: TTE Echo Complete w/o Contrast w/ Doppler (05.04.21 @ 11:42) >  Summary:   1. Normal global left ventricular systolic function.   2. Mild to moderately enlarged left atrium.   3. Normal right atrial size.   4. Trace mitral valve regurgitation.   5. Mild aortic regurgitation.   6. Sclerotic aortic valve with normal opening.   7. Cant exclude a bicuspid valve      Normal ascending aorta.   8. ASc aorta normal.   9. Mitral valve mean gradient is 1.0 mmHg consistent with normal mitral stenosis.    PHYSICIAN INTERPRETATION:  Left Ventricle: The left ventricular internal cavity size is normal. Left ventricular wall thickness is normal. Global LV systolic function was normal. Normal segmental left ventricular systolic function.  Right Ventricle: Normal right ventricular size and function.  Left Atrium: Mild to moderately enlarged left atrium.  Right Atrium: Normal right atrial size.  Pericardium: There is no evidence of pericardial effusion.  Mitral Valve: The mitral valve is normal in structure. No evidence of mitral stenosis. Trace mitral valve regurgitation is seen. Mitral valve mean gradient is 1.0 mmHg consistent with normal mitral stenosis.  Tricuspid Valve: The tricuspid valve is normal in structure. Trivial tricuspid regurgitation is visualized.  Aortic Valve: No evidence of aortic stenosis. Sclerotic aortic valve with normal opening. Mild aortic valve regurgitation is seen. Cant exclude a bicuspid valve  Normal ascending aorta.  Pulmonic Valve: Trace pulmonic valve regurgitation.  Aorta: The aortic root is normal in size and structure. ASc aorta normal.    < end of copied text >    < from: CT Head No Cont (05.03.21 @ 22:06) >  Impression:      No evidence of intracranial hemorrhage, territorial infarct, or mass effect.    < end of copied text >    < from: CT Angio Head w/ IV Cont (05.03.21 @ 22:06) >  Findings:    NECK:    The visualized aortic arch and great vessel origins are patent. There is no stenosis at the origin of the right brachiocephalic, right and left common carotid, left subclavian, and right and left vertebral arteries. The left common carotid artery arises from a common trunk with the right brachiocephalic artery (anatomic variation).    The right and left common, internal and external carotid arteries are patent. There is mild calcification but no stenosis at the common carotid artery bifurcations.There is no evidence of significant stenosis or occlusion of the common carotid arteries, the carotid artery bifurcations, or along the course of the distal cervical portions of the right and left internal carotid arteries.    Normal branching pattern is noted of the bilateral external carotid arteries.    The vertebral arteries are patent.    HEAD:    The distal right and left internal carotid arteries are patent without calcification or stenosis in the region of the cavernous and supraclinoid portions of the ICAs.    The ophthalmic arteries are patent.    There is normal contrast filling of the middle cerebral arteries and the left anterior cerebral artery.  The A1 segment of the right anterior cerebral artery is hypoplastic with both distal anterior cerebral arteries are normally; the anterior communicating artery is patent (anatomic variation).  No evidence of stenosis, occlusion or aneurysm.    The distal vertebral arteries are patent.The basilar artery is patent. There is normal filling of the PCAs, SCAs, PICAs and AICAs bilaterally. No evidence of stenosis, occlusion or aneurysm.      No venous abnormalities are noted. No evidence of filling defects within the venous sinuses.    OTHER: The lung apices are clear.    IMPRESSION:    Negative CTA of the head and neck  No evidence of major vascular stenosis, occlusion, or aneurysm.      < end of copied text >    < from: MR Head No Cont (05.04.21 @ 08:20) >  FINDINGS:    The ventricles and cortical sulci are normal in size and configuration.    There are a few scattered punctate foci of T2/FLAIR signal hyperintensity within the cerebral hemispheric white matter consistent with chronic microvascular changes.    There is no evidence of acute intracranial hemorrhage, extra-axial fluid collection or midline shift.    There is no diffusion abnormality to suggest acute/subacute infarct. There is normal flow void present within the major vascular structures.    The pituitary is normal in size. The cerebellar tonsils are normal in position. There is normal marrow signal within the clivus.    Mild ethmoid sinus mucosal thickening is noted. The globes and orbits are unremarkable. The mastoids are unremarkable.    IMPRESSION:    1.  No evidence of recent infarct or acute intracranial hemorrhage.    2.  Mild chronic microvascular changes.    < end of copied text >    < from: CT Head No Cont (07.31.21 @ 07:37) >  IMPRESSION:    No acute intracranial pathology. No evidence of midline shift, mass effect or intracranial hemorrhage.    < end of copied text >        MEDICATIONS  (STANDING):  aspirin  chewable 81 milliGRAM(s) Oral daily  atorvastatin 10 milliGRAM(s) Oral at bedtime  pantoprazole    Tablet 40 milliGRAM(s) Oral before breakfast    MEDICATIONS  (PRN):      Vital Signs Last 24 Hrs  T(C): 36.1 (31 Jul 2021 14:15), Max: 36.6 (31 Jul 2021 01:00)  T(F): 96.9 (31 Jul 2021 14:15), Max: 97.8 (31 Jul 2021 01:00)  HR: 60 (31 Jul 2021 09:32) (56 - 67)  BP: 149/70 (31 Jul 2021 09:32) (99/55 - 149/70)  BP(mean): --  RR: 18 (31 Jul 2021 14:15) (18 - 20)  SpO2: 98% (31 Jul 2021 08:13) (98% - 98%)    PHYSICAL EXAM:    GENERAL: In no apparent distress, well nourished, and hydrated.  HEAD:  Atraumatic, Normocephalic  EYES: EOMI, PERRLA, conjunctiva and sclera clear  NECK: Supple and normal thyroid.  No JVD or carotid bruit.  Carotid pulse is 2+ bilaterally.  HEART: Regular rate and rhythm; No murmurs, rubs, or gallops.  PULMONARY: Clear to auscultation and perfusion.  No rales, wheezing, or rhonchi bilaterally.  ABDOMEN: Soft, Nontender, Nondistended; Bowel sounds present  EXTREMITIES:  2+ Peripheral Pulses, No clubbing, cyanosis, or edema  NEUROLOGICAL: Grossly nonfocal    I&O's Detail    Daily Height in cm: 190.5 (31 Jul 2021 09:32)    Daily     INTERPRETATION OF TELEMETRY:    ECG:  < from: 12 Lead ECG (05.04.21 @ 00:28) >  Ventricular Rate 53 BPM    Atrial Rate 53 BPM    P-R Interval 170 ms    QRS Duration 104 ms    Q-T Interval 430 ms    QTC Calculation(Bazett) 403 ms    P Axis 83 degrees    R Axis -25 degrees    T Axis 36 degrees    Diagnosis Line Sinus bradycardia  Left axis deviation  Otherwise normal ECG    < end of copied text >        LABS:                        12.9   10.23 )-----------( 258      ( 31 Jul 2021 03:29 )             38.7     07-31    137  |  101  |  19  ----------------------------<  113<H>  4.5   |  29  |  1.1    Ca    9.3      31 Jul 2021 03:29  Mg     2.1     07-31    TPro  6.9  /  Alb  4.5  /  TBili  0.4  /  DBili  x   /  AST  18  /  ALT  14  /  AlkPhos  90  07-31    CARDIAC MARKERS ( 31 Jul 2021 03:29 )  x     / <0.01 ng/mL / x     / x     / x          PT/INR - ( 31 Jul 2021 03:29 )   PT: 11.70 sec;   INR: 1.02 ratio         PTT - ( 31 Jul 2021 03:29 )  PTT:27.2 sec    BNP    COVID-19 PCR: NotDetec: (07.31.21 @ 03:31)            RADIOLOGY & ADDITIONAL STUDIES:

## 2021-07-31 NOTE — CONSULT NOTE ADULT - SUBJECTIVE AND OBJECTIVE BOX
Gastroenterology Consultation:    Patient is a 60y old  Male who presents with a chief complaint of syncope (31 Jul 2021 16:03)      Admitted on: 07-31-21  HPI:  60 year old male with hx of recently diagnosed HTN, HLD, chronic cough since being involved in 9/11, GERD presents acutely with an episode of syncope with generalized weakness, decreased PO intake, black stools, and nausea of one month's duration.  Patient states he went to get a glass of water last night and the last thing he remembers is being woken up by his daughter on the ground.  His daughter heard him fall and woke him up immediately.  He thinks he fell on his right side as that's where he's feeling pain.  Of note the EMS told him his blood pressure was 77/40 when he came, his family told him he was sweating.  He was recently diagnosed with hypertension and started on lisinopril after having lower extremity edema with amlodipine.    Patient has also been endorsing nausea for at least a month's duration along with early satiety.  He had an EGD (for chronic cough) 3 years ago and was diagnosed with GERD, he had a colonoscopy at the same time and was told it was clear with benign polyps.  He has been endorsing formed black stools for at least a month's duration as well and has not followed up with GI since, because of his decreased appetite he lost about 7 pounds in that time.    He currently denies chest pain, denies abdominal pain, denies diarrhea.    Triage vitals: BP 99/55  HR 67  RR 20  Temp 97.8  SpO2 98% on room air      Prior records Reviewed (Y/N): Y  History obtained from person other than patient (Y/N): N    Prior EGD and Prior Colonoscopy: as per patient by Dr. Doherty ,  EGD (for chronic cough) 3 years ago and was diagnosed with GERD, he had a colonoscopy at the same time and was told it was clear with benign polyps.       PAST MEDICAL & SURGICAL HISTORY:  HTN (hypertension)    Hyperlipidemia    No significant past surgical history        FAMILY HISTORY:  Family history of hypertension (Father)    FH: lung cancer (Father)        Social History:  Tobacco: N  Alcohol: occasionally  Drugs: N    Home Medications:  aspirin 81 mg oral tablet, chewable: 1 tab(s) orally once a day (31 Jul 2021 12:56)  atorvastatin 10 mg oral tablet: 1 tab(s) orally once a day (31 Jul 2021 12:56)  lisinopril 10 mg oral tablet: 1 tab(s) orally once a day (31 Jul 2021 12:55)  lisinopril 2.5 mg oral tablet: 1 tab(s) orally once a day (31 Jul 2021 12:55)  omeprazole 40 mg oral delayed release capsule: 1 cap(s) orally once a day (31 Jul 2021 12:56)    MEDICATIONS  (STANDING):  aspirin  chewable 81 milliGRAM(s) Oral daily  atorvastatin 10 milliGRAM(s) Oral at bedtime  pantoprazole    Tablet 40 milliGRAM(s) Oral before breakfast    MEDICATIONS  (PRN):      Allergies  No Known Allergies      Review of Systems:   Constitutional:  No Fever, No Chills  ENT/Mouth:  No Hearing Changes,  No Difficulty Swallowing  Eyes:  No Eye Pain, No Vision Changes  Cardiovascular:  No Chest Pain, No Palpitations  Respiratory:  No Cough, No Dyspnea  Gastrointestinal:  As described in HPI  Musculoskeletal:  No Joint Swelling, No Back Pain  Skin:  No Skin Lesions, No Jaundice  Neuro:  No Syncope, No Dizziness  Heme/Lymph:  No Bruising, No Bleeding.          Physical Examination:  T(C): 36.1 (07-31-21 @ 14:15), Max: 36.6 (07-31-21 @ 01:00)  HR: 60 (07-31-21 @ 09:32) (56 - 67)  BP: 149/70 (07-31-21 @ 09:32) (99/55 - 149/70)  RR: 18 (07-31-21 @ 14:15) (18 - 20)  SpO2: 98% (07-31-21 @ 08:13) (98% - 98%)  Height (cm): 190.5 (07-31-21 @ 09:32)  Weight (kg): 85.6 (07-31-21 @ 09:32)      Constitutional: No acute distress.  Eyes:. Conjunctivae are clear, Sclera is non-icteric.  Ears Nose and Throat: The external ears are normal appearing,  Oral mucosa is pink and moist.  Respiratory:  No signs of respiratory distress. Lung sounds are clear bilaterally.  Cardiovascular:  S1 S2, Regular rate and rhythm.  GI: Abdomen is soft, symmetric, and non-tender without distention. There are no visible lesions. Bowel sounds are present and normoactive in all four quadrants. No masses, hepatomegaly, or splenomegaly are noted. GILBERTO showed brown stool  Neuro: No Tremor, No involuntary movements  Skin: No rashes, No Jaundice.          Data: (reviewed by attending)                        12.9   10.23 )-----------( 258      ( 31 Jul 2021 03:29 )             38.7     Hgb Trend:  12.9  07-31-21 @ 03:29      07-31    137  |  101  |  19  ----------------------------<  113<H>  4.5   |  29  |  1.1    Ca    9.3      31 Jul 2021 03:29  Mg     2.1     07-31    TPro  6.9  /  Alb  4.5  /  TBili  0.4  /  DBili  x   /  AST  18  /  ALT  14  /  AlkPhos  90  07-31    Liver panel trend:  TBili 0.4   /   AST 18   /   ALT 14   /   AlkP 90   /   Tptn 6.9   /   Alb 4.5    /   DBili --      07-31      PT/INR - ( 31 Jul 2021 03:29 )   PT: 11.70 sec;   INR: 1.02 ratio         PTT - ( 31 Jul 2021 03:29 )  PTT:27.2 sec        Radiology:(reviewed by attending)

## 2021-07-31 NOTE — H&P ADULT - NSHPLABSRESULTS_GEN_ALL_CORE
LABS/RADIOLOGY RESULTS:                          12.9   10.23 )-----------( 258      ( 31 Jul 2021 03:29 )             38.7   07-31    137  |  101  |  19  ----------------------------<  113<H>  4.5   |  29  |  1.1    Ca    9.3      31 Jul 2021 03:29  Mg     2.1     07-31    TPro  6.9  /  Alb  4.5  /  TBili  0.4  /  DBili  x   /  AST  18  /  ALT  14  /  AlkPhos  90  07-31  PT/INR - ( 31 Jul 2021 03:29 )   PT: 11.70 sec;   INR: 1.02 ratio         PTT - ( 31 Jul 2021 03:29 )  PTT:27.2 secBlood Cultures    No acute intracranial pathology. No evidence of midline shift, mass effect or intracranial hemorrhage.

## 2021-07-31 NOTE — PATIENT PROFILE ADULT - FLU SEASON?
No Extra occular movements intact/PERRLA/Normal tympanic membranes/External ear normal/Nasal mucosa normal/Normal dentition/Normal oropharynx negative

## 2021-07-31 NOTE — H&P ADULT - NSHPPHYSICALEXAM_GEN_ALL_CORE
General: WN/WD NAD  Neurology: A&Ox3, nonfocal, PITT x 4  Head:  Normocephalic, atraumatic  ENT:  Mucosa moist, no ulcerations  Neck:  Supple, no sinuses or palpable masses  Lymphatic:  No palpable cervical, supraclavicular, axillary or inguinal adenopathy  Respiratory: CTA B/L  CV: RRR, S1S2, no murmur  Abdominal: Soft, NT, ND no palpable mass  MSK: No edema  Incisions: intact, no erythema or drainage

## 2021-07-31 NOTE — ED PROVIDER NOTE - ATTENDING CONTRIBUTION TO CARE
59 yo male with PMH HTN, HLD,  presented c/o syncopal episode. Pt reported he went to the kitchen and the next thing he knew his family found him on the floor. Pt reported intermittent lightheadedness x 1 month with several episodes dark stools. Also reported nausea and feeling full at times and not enjoying his meals as he did in past. Pt denied any vomiting, CP, palpitations, SOB, HA, focal weakness, paresthesias, or abdominal pain.     VITAL SIGNS: noted  CONSTITUTIONAL: Well-developed; well-nourished; in no acute distress  HEAD: Normocephalic; atraumatic  EYES: PERRL, EOM intact; conjunctiva and sclera clear  ENT: No nasal discharge; airway clear. MMM  NECK: Supple; non tender. No anterior cervical lymphadenopathy noted  CARD: S1, S2 normal; no murmurs, gallops, or rubs. Regular rate and rhythm  RESP: CTAB/L, no wheezes, rales or rhonchi  ABD: Normal bowel sounds; soft; non-distended; non-tender; no hepatosplenomegaly. No CVA tenderness  EXT: Normal ROM. No calf tenderness or edema. Distal pulses intact  NEURO: AAO x 3, normal speech, no facial asymmetry, negative pronator drift, no ataxia,  no nystagmus, sensory equal and intact.   SKIN: Skin exam is warm and dry, no acute rash  MS: No midline spinal tenderness

## 2021-07-31 NOTE — ED ADULT NURSE NOTE - NSIMPLEMENTINTERV_GEN_ALL_ED
Implemented All Fall Risk Interventions:  [place] to call system. Call bell, personal items and telephone within reach. Instruct patient to call for assistance. Room bathroom lighting operational. Non-slip footwear when patient is off stretcher. Physically safe environment: no spills, clutter or unnecessary equipment. Stretcher in lowest position, wheels locked, appropriate side rails in place. Provide visual cue, wrist band, yellow gown, etc. Monitor gait and stability. Monitor for mental status changes and reorient to person, place, and time. Review medications for side effects contributing to fall risk. Reinforce activity limits and safety measures with patient and family.

## 2021-07-31 NOTE — H&P ADULT - ASSESSMENT
60 year old male with hx of recently diagnosed HTN, HLD, chronic cough since being involved in 9/11, GERD presents acutely with an episode of syncope with generalized weakness, decreased PO intake, black stools, and nausea of one month's duration 60 year old male with hx of recently diagnosed HTN, HLD, chronic cough since being involved in 9/11, GERD presents acutely with an episode of syncope with generalized weakness, decreased PO intake, black stools, and nausea of one month's duration    # Syncope possibly vasovagal secondary to decreased PO intake and new HTN meds, rule out arrhythmia  - CT head negative, had a syncopal episode in May as well CT head and MRI were negative  - was told that he was sweaty and BP was 77/40 done by EMS at his house right after his fall  - admits to decreased PO intake because of reduced appetite and early satiety of over a month duration / patient has also been having black formed stools, GILBERTO negative  - f/u orthostatics, f/u GI  - because of recurrent syncope and sudden collapse, some suspicion for cardiac cause, f/u EP / patient is juaquin in the 50s at rest  -  60 year old male with hx of recently diagnosed HTN, HLD, chronic cough since being involved in 9/11, GERD presents acutely with an episode of syncope with generalized weakness, decreased PO intake, black stools, and nausea of one month's duration    # Syncope possibly vasovagal secondary to decreased PO intake and new HTN meds, rule out arrhythmia  - CT head negative, had a syncopal episode in May as well CT head and MRI were negative  - was told that he was sweaty and BP was 77/40 done by EMS at his house right after his fall  - admits to decreased PO intake because of reduced appetite and early satiety of over a month duration / patient has also been having black formed stools, GILBERTO negative  - f/u orthostatics, f/u GI  - because of recurrent syncope and sudden collapse, some suspicion for cardiac cause, f/u EP / patient is juaquin in the 50s at rest   60 year old male with hx of recently diagnosed HTN, HLD, chronic cough since being involved in 9/11, GERD presents acutely with an episode of syncope with generalized weakness, decreased PO intake, black stools, and nausea of one month's duration    # Syncope possibly vasovagal secondary to decreased PO intake and new HTN meds, rule out arrhythmia  - CT head negative, had a syncopal episode in May as well CT head and MRI were negative  - was told that he was sweaty and BP was 77/40 done by EMS at his house right after his fall  - admits to decreased PO intake because of reduced appetite and early satiety of over a month duration / patient has also been having black formed stools, GILBERTO negative  - f/u orthostatics, f/u GI  - because of recurrent syncope and sudden collapse, some suspicion for cardiac cause, f/u EP / patient is juaquin in the 50s at rest  - echo in May showed normal ventricular systolic function  - f/u TSH / lyme titers  - monitor on tele    # Normocytic anemia with nausea / early satiety / black formed stools  - symptoms have been for about a month he states, daughter states it may have been longer, has hx of GERD  - had EGD in 2018, does not remember what findings were / consulted GI due to alarm symptoms, has mild anemia  - may need repeat EGD  - f/u retic and iron studies for anemia  - GILBERTO negative in the ED    # HTN  - holding meds for now pending orthostatics    # HLD  - c/w atorvastastin 10 qd    PLAN: f/u EP and GI, f/u orthostatics    # DVT PPX: possibility of slow upper GI bleed, patient is ambulatory  # GI PPX: protonix  # Diet: Regular  FULL CODE

## 2021-07-31 NOTE — H&P ADULT - NSHPREVIEWOFSYSTEMS_GEN_ALL_CORE
REVIEW OF SYSTEMS:    CONSTITUTIONAL: generalized weakness, no fevers or chills  EYES/ENT: No visual changes;  No vertigo or throat pain   NECK: No pain or stiffness  RESPIRATORY: No cough currently, no wheezing, hemoptysis; No shortness of breath  CARDIOVASCULAR: No chest pain or palpitations  GASTROINTESTINAL: No abdominal or epigastric pain. mild nausea, no vomiting, or hematemesis; No diarrhea or constipation. black formed stools  GENITOURINARY: No dysuria, frequency or hematuria  NEUROLOGICAL: No numbness or weakness  SKIN: No itching, rashes

## 2021-07-31 NOTE — ED PROVIDER NOTE - OBJECTIVE STATEMENT
60y M pmh HLD, HTN, Lung Disease s/p 9/11, OA presents for eval s/p syncope. Pt awoke last night to go to the bathroom and was found by his family unconscious in the kitchen, now presents with generalized weakness, aggravated with ambulation, relieved at rest. Pt has intermittent episodes of lightheadedness x1mo with associated dark stools.

## 2021-07-31 NOTE — ED PROVIDER NOTE - CLINICAL SUMMARY MEDICAL DECISION MAKING FREE TEXT BOX
pt evaluated for syncopal episode, labs unremarkable, EKG NSR, CXR NAPD. pt admitted to telemetry for further monitoring and treatment

## 2021-07-31 NOTE — CONSULT NOTE ADULT - ATTENDING COMMENTS
pt who presents with syncopal episode, reports melena, though brown stool on GILBERTO. can plan for EGD after syncope w/u.
Recurrent Syncope  recommend ILR implant  recommend BARBARA r/o bicuspid Ao valve  We discussed the risks/benefits/alternatives, nature of procedure, and follow up care after device is implanted. We also discussed remote monitoring in details. Patient is in agreement with proceeding with the device implant. We discussed the risks of bleeding, hematoma, infection, device malfunction. Patient expressed understanding of the discussion. I answered all the questions in details.

## 2021-07-31 NOTE — H&P ADULT - NSICDXFAMILYHX_GEN_ALL_CORE_FT
FAMILY HISTORY:  Father  Still living? Unknown  Family history of hypertension, Age at diagnosis: Age Unknown  FH: lung cancer, Age at diagnosis: Age Unknown

## 2021-07-31 NOTE — ED PROVIDER NOTE - PHYSICAL EXAMINATION
CONST: NAD  EYES: PERRL, EOMI, Sclera and conjunctiva clear.   ENT: No nasal discharge. Oropharynx normal appearing, no erythema or exudates. No abscess or swelling. Uvula midline.   NECK: Non-tender, no meningeal signs. normal ROM. supple   CARD: Hypotensive. S1 S2; No jvd  RESP: Equal BS B/L, No wheezes, rhonchi or rales. No distress  GI: Soft, non-tender, non-distended. no cva tenderness. normal BS. No blood on GILBERTO  MS: Normal ROM in all extremities. pulses 2 +. no calf tenderness or swelling  SKIN: Warm, dry, no acute rashes. Good turgor  NEURO: A&Ox3, No focal deficits. Strength 5/5 with no sensory deficits. Finger to nose intact. Negative pronator drift

## 2021-07-31 NOTE — ED PROVIDER NOTE - NS ED ROS FT
Constitutional: (+) fatigue, (-) fever  Eyes/ENT: (-) blurry vision, (-) epistaxis  Cardiovascular: (+) syncope, (-) chest pain  Respiratory: (-) cough, (-) shortness of breath  Gastrointestinal: (+) dark stools, (-) vomiting, (-) diarrhea  : (-) dysuria, (-) hematuria  Musculoskeletal: (-) neck pain, (-) back pain, (-) joint pain  Integumentary: (-) rash, (-) edema  Neurological: (-) headache, (-) altered mental status  Allergic/Immunologic: (-) pruritus

## 2021-07-31 NOTE — ED ADULT NURSE NOTE - OBJECTIVE STATEMENT
A&Ox4  sitting up in stretcher  family member at bedside  pt presents to ED s/p unwitnessed fall at home  pt reports he got out of bed and then remembers waking up on the floor. pt reports EMS said his BP was low  pt reports pain to right side of body including right elbow and right hip  heplock in place to right arm per EMS  pmhx HTN, GERD, HLD. pt right foot in boot, pt reports he broke his foot 3 days ago.  pt placed on cardiac monitor with BP monitoring  pt also reports nausea and dark stools x 3 weeks  no s/s of distress  denies pain  Awaiting MD young and disposition

## 2021-07-31 NOTE — H&P ADULT - HISTORY OF PRESENT ILLNESS
60 year old male with hx of recently diagnosed HTN, HLD, chronic cough since being involved in 9/11, GERD  60 year old male with hx of recently diagnosed HTN, HLD, chronic cough since being involved in 9/11, GERD presents acutely with an episode of syncope with generalized weakness, decreased PO intake, black stools, and nausea of one month's duration.  Patient states he went to get a glass of water last night and the last thing he remembers is being woken up by his daughter on the ground.  His daughter heard him fall and woke him up immediately.  He thinks he fell on his right side as that's where he's feeling pain.  Of note the EMS told him his blood pressure was 77/40 when he came, his family told him he was sweating.  He was recently diagnosed with hypertension and started on lisinopril after having lower extremity edema with amlodipine.    Patient has also been endorsing nausea for at least a month's duration along with early satiety.  He had an EGD (for chronic cough) 3 years ago and was diagnosed with GERD, he had a colonoscopy at the same time and was told it was clear with benign polyps.  He has been endorsing formed black stools for at least a month's duration as well and has not followed up with GI since, because of his decreased appetite he lost about 7 pounds in that time.    He currently denies chest pain, denies abdominal pain, denies diarrhea.    Triage vitals: BP 99/55  HR 67  RR 20  Temp 97.8  SpO2 98% on room air

## 2021-08-01 LAB
ALBUMIN SERPL ELPH-MCNC: 4.3 G/DL — SIGNIFICANT CHANGE UP (ref 3.5–5.2)
ALP SERPL-CCNC: 83 U/L — SIGNIFICANT CHANGE UP (ref 30–115)
ALT FLD-CCNC: 15 U/L — SIGNIFICANT CHANGE UP (ref 0–41)
ANION GAP SERPL CALC-SCNC: 8 MMOL/L — SIGNIFICANT CHANGE UP (ref 7–14)
AST SERPL-CCNC: 21 U/L — SIGNIFICANT CHANGE UP (ref 0–41)
BASOPHILS # BLD AUTO: 0.04 K/UL — SIGNIFICANT CHANGE UP (ref 0–0.2)
BASOPHILS NFR BLD AUTO: 0.5 % — SIGNIFICANT CHANGE UP (ref 0–1)
BILIRUB SERPL-MCNC: 0.7 MG/DL — SIGNIFICANT CHANGE UP (ref 0.2–1.2)
BUN SERPL-MCNC: 13 MG/DL — SIGNIFICANT CHANGE UP (ref 10–20)
CALCIUM SERPL-MCNC: 9.5 MG/DL — SIGNIFICANT CHANGE UP (ref 8.5–10.1)
CHLORIDE SERPL-SCNC: 104 MMOL/L — SIGNIFICANT CHANGE UP (ref 98–110)
CO2 SERPL-SCNC: 26 MMOL/L — SIGNIFICANT CHANGE UP (ref 17–32)
CREAT SERPL-MCNC: 0.9 MG/DL — SIGNIFICANT CHANGE UP (ref 0.7–1.5)
EOSINOPHIL # BLD AUTO: 0.12 K/UL — SIGNIFICANT CHANGE UP (ref 0–0.7)
EOSINOPHIL NFR BLD AUTO: 1.6 % — SIGNIFICANT CHANGE UP (ref 0–8)
GLUCOSE SERPL-MCNC: 101 MG/DL — HIGH (ref 70–99)
HCT VFR BLD CALC: 38.9 % — LOW (ref 42–52)
HGB BLD-MCNC: 13.2 G/DL — LOW (ref 14–18)
IMM GRANULOCYTES NFR BLD AUTO: 0.3 % — SIGNIFICANT CHANGE UP (ref 0.1–0.3)
IRON SATN MFR SERPL: 123 UG/DL — SIGNIFICANT CHANGE UP (ref 35–150)
IRON SATN MFR SERPL: 45 % — SIGNIFICANT CHANGE UP (ref 15–50)
LYMPHOCYTES # BLD AUTO: 1.66 K/UL — SIGNIFICANT CHANGE UP (ref 1.2–3.4)
LYMPHOCYTES # BLD AUTO: 21.8 % — SIGNIFICANT CHANGE UP (ref 20.5–51.1)
MAGNESIUM SERPL-MCNC: 2.1 MG/DL — SIGNIFICANT CHANGE UP (ref 1.8–2.4)
MCHC RBC-ENTMCNC: 31.1 PG — HIGH (ref 27–31)
MCHC RBC-ENTMCNC: 33.9 G/DL — SIGNIFICANT CHANGE UP (ref 32–37)
MCV RBC AUTO: 91.7 FL — SIGNIFICANT CHANGE UP (ref 80–94)
MONOCYTES # BLD AUTO: 0.76 K/UL — HIGH (ref 0.1–0.6)
MONOCYTES NFR BLD AUTO: 10 % — HIGH (ref 1.7–9.3)
NEUTROPHILS # BLD AUTO: 5 K/UL — SIGNIFICANT CHANGE UP (ref 1.4–6.5)
NEUTROPHILS NFR BLD AUTO: 65.8 % — SIGNIFICANT CHANGE UP (ref 42.2–75.2)
NRBC # BLD: 0 /100 WBCS — SIGNIFICANT CHANGE UP (ref 0–0)
PLATELET # BLD AUTO: 260 K/UL — SIGNIFICANT CHANGE UP (ref 130–400)
POTASSIUM SERPL-MCNC: 4.6 MMOL/L — SIGNIFICANT CHANGE UP (ref 3.5–5)
POTASSIUM SERPL-SCNC: 4.6 MMOL/L — SIGNIFICANT CHANGE UP (ref 3.5–5)
PROT SERPL-MCNC: 6.7 G/DL — SIGNIFICANT CHANGE UP (ref 6–8)
RBC # BLD: 4.24 M/UL — LOW (ref 4.7–6.1)
RBC # BLD: 4.24 M/UL — LOW (ref 4.7–6.1)
RBC # FLD: 11.2 % — LOW (ref 11.5–14.5)
RETICS #: 41.1 K/UL — SIGNIFICANT CHANGE UP (ref 25–125)
RETICS/RBC NFR: 1 % — SIGNIFICANT CHANGE UP (ref 0.5–1.5)
SODIUM SERPL-SCNC: 138 MMOL/L — SIGNIFICANT CHANGE UP (ref 135–146)
TIBC SERPL-MCNC: 275 UG/DL — SIGNIFICANT CHANGE UP (ref 220–430)
TRANSFERRIN SERPL-MCNC: 234 MG/DL — SIGNIFICANT CHANGE UP (ref 200–360)
TROPONIN T SERPL-MCNC: <0.01 NG/ML — SIGNIFICANT CHANGE UP
UIBC SERPL-MCNC: 152 UG/DL — SIGNIFICANT CHANGE UP (ref 110–370)
WBC # BLD: 7.6 K/UL — SIGNIFICANT CHANGE UP (ref 4.8–10.8)
WBC # FLD AUTO: 7.6 K/UL — SIGNIFICANT CHANGE UP (ref 4.8–10.8)

## 2021-08-01 PROCEDURE — 99233 SBSQ HOSP IP/OBS HIGH 50: CPT | Mod: 57

## 2021-08-01 PROCEDURE — 99233 SBSQ HOSP IP/OBS HIGH 50: CPT

## 2021-08-01 RX ORDER — LISINOPRIL 2.5 MG/1
10 TABLET ORAL EVERY 24 HOURS
Refills: 0 | Status: DISCONTINUED | OUTPATIENT
Start: 2021-08-01 | End: 2021-08-01

## 2021-08-01 RX ORDER — HYDROCHLOROTHIAZIDE 25 MG
12.5 TABLET ORAL EVERY 24 HOURS
Refills: 0 | Status: DISCONTINUED | OUTPATIENT
Start: 2021-08-01 | End: 2021-08-01

## 2021-08-01 RX ORDER — LISINOPRIL 2.5 MG/1
10 TABLET ORAL AT BEDTIME
Refills: 0 | Status: DISCONTINUED | OUTPATIENT
Start: 2021-08-01 | End: 2021-08-04

## 2021-08-01 RX ORDER — ACETAMINOPHEN 500 MG
650 TABLET ORAL EVERY 6 HOURS
Refills: 0 | Status: DISCONTINUED | OUTPATIENT
Start: 2021-08-01 | End: 2021-08-04

## 2021-08-01 RX ORDER — SODIUM CHLORIDE 9 MG/ML
1000 INJECTION INTRAMUSCULAR; INTRAVENOUS; SUBCUTANEOUS
Refills: 0 | Status: DISCONTINUED | OUTPATIENT
Start: 2021-08-01 | End: 2021-08-02

## 2021-08-01 RX ORDER — LISINOPRIL 2.5 MG/1
10 TABLET ORAL DAILY
Refills: 0 | Status: DISCONTINUED | OUTPATIENT
Start: 2021-08-01 | End: 2021-08-01

## 2021-08-01 RX ORDER — ENOXAPARIN SODIUM 100 MG/ML
40 INJECTION SUBCUTANEOUS DAILY
Refills: 0 | Status: DISCONTINUED | OUTPATIENT
Start: 2021-08-01 | End: 2021-08-04

## 2021-08-01 RX ADMIN — ATORVASTATIN CALCIUM 10 MILLIGRAM(S): 80 TABLET, FILM COATED ORAL at 21:33

## 2021-08-01 RX ADMIN — SODIUM CHLORIDE 75 MILLILITER(S): 9 INJECTION INTRAMUSCULAR; INTRAVENOUS; SUBCUTANEOUS at 22:27

## 2021-08-01 RX ADMIN — ENOXAPARIN SODIUM 40 MILLIGRAM(S): 100 INJECTION SUBCUTANEOUS at 11:57

## 2021-08-01 RX ADMIN — Medication 81 MILLIGRAM(S): at 11:56

## 2021-08-01 RX ADMIN — Medication 650 MILLIGRAM(S): at 22:25

## 2021-08-01 RX ADMIN — SODIUM CHLORIDE 75 MILLILITER(S): 9 INJECTION INTRAMUSCULAR; INTRAVENOUS; SUBCUTANEOUS at 11:55

## 2021-08-01 RX ADMIN — PANTOPRAZOLE SODIUM 40 MILLIGRAM(S): 20 TABLET, DELAYED RELEASE ORAL at 05:54

## 2021-08-01 RX ADMIN — LISINOPRIL 10 MILLIGRAM(S): 2.5 TABLET ORAL at 21:33

## 2021-08-01 RX ADMIN — Medication 650 MILLIGRAM(S): at 23:12

## 2021-08-01 NOTE — CONSULT NOTE ADULT - ASSESSMENT
60 year old male with hx of recently diagnosed HTN, HLD, chronic cough since being involved in 9/11, GERD presents acutely with an episode of syncope with generalized weakness, decreased PO intake, black stools, and nausea of one month's duration      # Early satiety and decreased PO intake for 1 month:  - lost 7 lbs over the past month  - GILBERTO showed brown stool  - EGD/colonoscopy 3 years ago--> unremarkable as per patient    Rec:  -Advance diet as tolerated  - patient currently on telemetry for syncopal episode  - Cardiology eval for syncopal episode--> if patient medically stable for EGD   - Will plan for EGD ( inpatient vs outpatient) after syncope work up completed   
syncope: unknown etiology  orthostatic hypotension: due to lack of intake, recent weight loss, state has not been eating or drinking much recently  possible bicuspid aortic valve, doubt it    will do BARBARA to evaluate the aortic valve and r/i myxoma as the cause of syncope and thrombus, as the cause of the TIA  needs a loop recorder, I spoke to him and explained it, he agreed  needs GI evaluation for the cause of lack of appetite and weight loss  d/c diuretics and keep the standing BP at 120-130 mmHg 
Cards: Dr Nicolas    59yo Male with GERD, HLD, HTN, admitted with recurrent syncope  prior neuro work up negative  Echo with possible bicuspid AV    syncope  HTN    con't tele  consider restarting home antihypertensives (Lisinopril + HCTZ) to assess for orthostatic changes while on medications in the controlled environment  serial orthostatic vitals while on home antihypertensive regiment  consult Dr Nicolas for BARBARA to assess aortic valve  Loop recorder placement for long term cardiac monitoring for arrhythmias as etiology of patient's symptoms   will plan after BARBARA  Maintain electrolytes K>4.0 Mg >2.0   will follow

## 2021-08-01 NOTE — PROGRESS NOTE ADULT - SUBJECTIVE AND OBJECTIVE BOX
INTERVAL HPI/OVERNIGHT EVENTS:  orthostatic this AM even without Lisinopril and HCTZ  no arrhythmias    MEDICATIONS  (STANDING):  aspirin  chewable 81 milliGRAM(s) Oral daily  atorvastatin 10 milliGRAM(s) Oral at bedtime  enoxaparin Injectable 40 milliGRAM(s) SubCutaneous daily  lisinopril 10 milliGRAM(s) Oral daily  pantoprazole    Tablet 40 milliGRAM(s) Oral before breakfast  sodium chloride 0.9%. 1000 milliLiter(s) (75 mL/Hr) IV Continuous <Continuous>    MEDICATIONS  (PRN):      Allergies    No Known Allergies    Intolerances        REVIEW OF SYSTEMS    [x ] A ten-point review of systems was otherwise negative except as noted.  [ ] Due to altered mental status/intubation, subjective information were not able to be obtained from the patient. History was obtained, to the extent possible, from review of the chart and collateral sources of information.      Vital Signs Last 24 Hrs  T(C): 36.4 (01 Aug 2021 13:57), Max: 36.6 (01 Aug 2021 05:11)  T(F): 97.5 (01 Aug 2021 13:57), Max: 97.8 (01 Aug 2021 05:11)  HR: 70 (01 Aug 2021 13:57) (70 - 79)  BP: 123/63 (01 Aug 2021 13:57) (123/63 - 138/64)  BP(mean): --  RR: 18 (01 Aug 2021 13:57) (18 - 18)  SpO2: 98% (31 Jul 2021 19:59) (98% - 98%)    07-31-21 @ 07:01  -  08-01-21 @ 07:00  --------------------------------------------------------  IN: 360 mL / OUT: 0 mL / NET: 360 mL        PHYSICAL EXAM:    GENERAL: In no apparent distress, well nourished, and hydrated.  HEART: Regular rate and rhythm; No murmur; NO rubs, or gallops.  PULMONARY: Clear to auscultation and percussion.  Normal expansion/effort. No rales, wheezing, or rhonchi bilaterally.  ABDOMEN: Soft, Nontender, Nondistended; Bowel sounds present  EXTREMITIES:  Extremities warm, pink, well-perfused, 2+ Peripheral Pulses, No clubbing, cyanosis, or edema  NEUROLOGICAL: alert & oriented x 3, no focal deficits, PERRLA, EOMI    LABS:                        13.2   7.60  )-----------( 260      ( 01 Aug 2021 07:23 )             38.9     08-01    138  |  104  |  13  ----------------------------<  101<H>  4.6   |  26  |  0.9    Ca    9.5      01 Aug 2021 07:23  Mg     2.1     08-01    TPro  6.7  /  Alb  4.3  /  TBili  0.7  /  DBili  x   /  AST  21  /  ALT  15  /  AlkPhos  83  08-01    PT/INR - ( 31 Jul 2021 03:29 )   PT: 11.70 sec;   INR: 1.02 ratio         PTT - ( 31 Jul 2021 03:29 )  PTT:27.2 sec    COVID-19 PCR: NotDetec: d (07.31.21 @ 03:31)        RADIOLOGY & ADDITIONAL TESTS:

## 2021-08-01 NOTE — CONSULT NOTE ADULT - SUBJECTIVE AND OBJECTIVE BOX
Patient is a 60y old  Male who presents with a chief complaint of syncope (01 Aug 2021 10:58)      HPI:  Patient presented with syncope, fatigue, un wanted or un planned weight loss. orthostatic hypotension, one episode of LOC-syncope.  Echo reported as possible bicuspid aortic valve, BARBARA was suggested  he had a pre syncopal feeling on March, admitted to [facility], he was told of possibly having TIA  He saw me for the firt time on May and supposedto have a stress test next week to evaluate for the CAD, but he had a recent trauma, his foot is in the boot    60 year old male with hx of recently diagnosed HTN, HLD, chronic cough since being involved in 9/11, GERD presents acutely with an episode of syncope with generalized weakness, decreased PO intake, black stools, and nausea of one month's duration.  Patient states he went to get a glass of water last night and the last thing he remembers is being woken up by his daughter on the ground.  His daughter heard him fall and woke him up immediately.  He thinks he fell on his right side as that's where he's feeling pain.  Of note the EMS told him his blood pressure was 77/40 when he came, his family told him he was sweating.  He was recently diagnosed with hypertension and started on lisinopril after having lower extremity edema with amlodipine.    Patient has also been endorsing nausea for at least a month's duration along with early satiety.  He had an EGD (for chronic cough) 3 years ago and was diagnosed with GERD, he had a colonoscopy at the same time and was told it was clear with benign polyps.  He has been endorsing formed black stools for at least a month's duration as well and has not followed up with GI since, because of his decreased appetite he lost about 7 pounds in that time.    He currently denies chest pain, denies abdominal pain, denies diarrhea.    Triage vitals: BP 99/55  HR 67  RR 20  Temp 97.8  SpO2 98% on room air (31 Jul 2021 12:57)      PAST MEDICAL & SURGICAL HISTORY:  HTN (hypertension)    Hyperlipidemia    No significant past surgical history        PREVIOUS DIAGNOSTIC TESTING:        MEDICATIONS  (STANDING):  aspirin  chewable 81 milliGRAM(s) Oral daily  atorvastatin 10 milliGRAM(s) Oral at bedtime  enoxaparin Injectable 40 milliGRAM(s) SubCutaneous daily  lisinopril 10 milliGRAM(s) Oral daily  pantoprazole    Tablet 40 milliGRAM(s) Oral before breakfast  sodium chloride 0.9%. 1000 milliLiter(s) (75 mL/Hr) IV Continuous <Continuous>    MEDICATIONS  (PRN):      FAMILY HISTORY:  Family history of hypertension (Father)    FH: lung cancer (Father)        SOCIAL HISTORY:  CIGARETTES: ex smoker  ALCOHOL: social   DRUGS:no                      REVIEW OF SYSTEMS:  CONSTITUTIONAL: No distress, complains of fatigue, weight loss,  NECK: No pain  RESPIRATORY: No cough, wheezing, shortness of breath  CARDIOVASCULAR: No chest pain, SOB, palpitations, leg swelling  GASTROINTESTINAL: No abdominal or epigastric pain. No nausea, vomiting, or hematemesis;  No melena.  NEUROLOGICAL: dizziness by standing but no headaches, no memory loss, loss of strength  SKIN: No itching, burning, rashes, or lesions   ENDOCRINE: No heat or cold intolerance  MUSCULOSKELETAL: right foot is in the boot, joint pain, No  swelling; No muscle pain  PSYCHIATRIC: No depression, anxiety,  ALLERGY: No hives, itching, rash          Vital Signs Last 24 Hrs  T(C): 36.6 (01 Aug 2021 05:11), Max: 36.6 (01 Aug 2021 05:11)  T(F): 97.8 (01 Aug 2021 05:11), Max: 97.8 (01 Aug 2021 05:11)  HR: 79 (31 Jul 2021 22:15) (79 - 79)  BP: 138/64 (31 Jul 2021 22:15) (138/64 - 138/64)  BP(mean): --  RR: 18 (01 Aug 2021 05:11) (18 - 18)  SpO2: 98% (31 Jul 2021 19:59) (98% - 98%)                      PHYSICAL EXAM:  GENERAL: No distress, well developed  HEAD:  Atraumatic, Normocephalic  NECK: Supple, No JVD, No Bruit of either carotid arteries  NERVOUS SYSTEM:  Alert, Awake, Oriented to time, place, person; Normal memory and speech; Normal motor Strength 5/5 B/L upper and lower extremities  CHEST/LUNG: Normal air entry to lung base bilaterally; No wheeze, crackle, rales, rhonchi  HEART: Regular heart beat, S1, A2, P2, No S3, No S4, No gallop, No murmur, orthostatic hypotension  ABDOMEN: Soft, Non tender, Non distended; Bowel sounds present  EXTREMITIES: upper 2+ Peripheral Pulses, No clubbing, No edema, right foot in the boot  SKIN: No rashes or lesions    TELEMETRY: NSR    ECG: < from: 12 Lead ECG (05.04.21 @ 00:28) >   Sinus bradycardia  Left axis deviation  Otherwise normal ECG    < end of copied text >      I&O's Detail    31 Jul 2021 07:01  -  01 Aug 2021 07:00  --------------------------------------------------------  IN:    Oral Fluid: 360 mL  Total IN: 360 mL    OUT:  Total OUT: 0 mL    Total NET: 360 mL          LABS:                        13.2   7.60  )-----------( 260      ( 01 Aug 2021 07:23 )             38.9     08-01    138  |  104  |  13  ----------------------------<  101<H>  4.6   |  26  |  0.9    Ca    9.5      01 Aug 2021 07:23  Mg     2.1     08-01    TPro  6.7  /  Alb  4.3  /  TBili  0.7  /  DBili  x   /  AST  21  /  ALT  15  /  AlkPhos  83  08-01    CARDIAC MARKERS ( 01 Aug 2021 07:23 )  x     / <0.01 ng/mL / x     / x     / x      CARDIAC MARKERS ( 31 Jul 2021 03:29 )  x     / <0.01 ng/mL / x     / x     / x          PT/INR - ( 31 Jul 2021 03:29 )   PT: 11.70 sec;   INR: 1.02 ratio         PTT - ( 31 Jul 2021 03:29 )  PTT:27.2 sec    I&O's Summary    31 Jul 2021 07:01  -  01 Aug 2021 07:00  --------------------------------------------------------  IN: 360 mL / OUT: 0 mL / NET: 360 mL        RADIOLOGY & ADDITIONAL STUDIES: < from: Xray Chest 1 View- PORTABLE-Urgent (Xray Chest 1 View- PORTABLE-Urgent .) (07.31.21 @ 04:55) >  Support devices: None.    Cardiac/mediastinum/hilum: Stable.    Lung parenchyma/Pleura: Within normal limits.    Skeleton/soft tissues: Stable.    Impression:    No radiographic evidence of acute cardiopulmonary disease.      ECHOCARDIOGRAPHY:  < end of copied text >  < from: TTE Echo Complete w/o Contrast w/ Doppler (05.04.21 @ 11:42) >   1. Normal global left ventricular systolic function.   2. Mild to moderately enlarged left atrium.   3. Normal right atrial size.   4. Trace mitral valve regurgitation.   5. Mild aortic regurgitation.   6. Sclerotic aortic valve with normal opening.   7. Cant exclude a bicuspid valve      Normal ascending aorta.   8. ASc aorta normal.   9. Mitral valve mean gradient is 1.0 mmHg consistent with normal mitral stenosis.    < end of copied text >    HEAD CT SCAN:  < from: CT Head No Cont (07.31.21 @ 07:37) >  No acute intracranial pathology. No evidence of midline shift, mass effect or intracranial hemorrhage.    < end of copied text >

## 2021-08-01 NOTE — PROGRESS NOTE ADULT - ASSESSMENT
60 year old male with hx of recently diagnosed HTN, HLD, chronic cough since being involved in 9/11, GERD presents acutely with an episode of syncope with generalized weakness, decreased PO intake, black stools, and nausea of one month's duration    # Syncope possibly vasovagal secondary to decreased PO intake and new HTN meds, rule out arrhythmia  - CT head negative, had a syncopal episode in May as well CT head and MRI were negative  - was told that he was sweaty and BP was 77/40 done by EMS at his house right after his fall  - admits to decreased PO intake because of reduced appetite and early satiety of over a month duration / patient has also been having black formed stools, GILBERTO negative  - because of recurrent syncope and sudden collapse, some suspicion for cardiac cause, patient is juaquin in the 50s at rest  - echo in May showed normal ventricular systolic function  - orthostatics VS positive this AM  - repeat ortho VS  - as per EP, will start on IV fluids, reschedule lisinopril to PM. d/c HCTZ  - f/u EP recs  - GI cs appreciated plan for EGD ( inpatient vs outpatient) after syncope work up completed  - f/u TSH / lyme titers  - monitor on tele  - f/u Cardio consult ( Dr. Nicolas) for BARBARA to rule out bicuspid AV    # Normocytic anemia with nausea / early satiety / black formed stools  - symptoms have been for about a month he states, daughter states it may have been longer, has hx of GERD  - had EGD in 2018, does not remember what findings were / consulted GI due to alarm symptoms, has mild anemia  - f/u retic and iron studies for anemia  - GILBERTO negative in the ED  - GI cs appreciated- plan for EGD ( inpatient vs outpatient) after syncope work up completed    # HTN  - orthostatics VS positive  - repeat orthostatic VS today  -  reschedule lisinopril to PM. d/c HCTZ as per EP recs    # HLD  - c/w atorvastatin 10 qd    PLAN:  f/u orthostatics,  BARBAAR    # DVT PPX: lovenox  # GI PPX: protonix  # Diet: Regular  FULL CODE 60 year old male with hx of recently diagnosed HTN, HLD, chronic cough since being involved in 9/11, GERD presents acutely with an episode of syncope with generalized weakness, decreased PO intake, black stools, and nausea of one month's duration    # Syncope possibly vasovagal secondary to decreased PO intake and new HTN meds, rule out arrhythmia  - CT head negative, had a syncopal episode in May as well CT head and MRI were negative  - was told that he was sweaty and BP was 77/40 done by EMS at his house right after his fall  - admits to decreased PO intake because of reduced appetite and early satiety of over a month duration / patient has also been having black formed stools, GILBERTO negative  - because of recurrent syncope and sudden collapse, some suspicion for cardiac cause, patient is juaquin in the 50s at rest  - echo in May showed normal ventricular systolic function  - orthostatics VS positive this AM  - repeat ortho VS  - as per EP, will start on IV fluids, reschedule lisinopril to PM. d/c HCTZ  - f/u EP recs  - GI cs appreciated plan for EGD ( inpatient vs outpatient) after syncope work up completed  - f/u TSH / lyme titers  - monitor on tele  - f/u Cardio consult ( Dr. Nicolas) for BARBARA to rule out bicuspid AV    Attending Note: Pt has had multiple readings of Orthostatic hypotn, EP helping adjust meds, PT does have a COVID hx. Consult Dr. Moise for possible BARBARA     # Normocytic anemia with nausea / early satiety / black formed stools  - symptoms have been for about a month he states, daughter states it may have been longer, has hx of GERD  - had EGD in 2018, does not remember what findings were / consulted GI due to alarm symptoms, has mild anemia  - f/u retic and iron studies for anemia  - GILBERTO negative in the ED  - GI cs appreciated- plan for EGD ( inpatient vs outpatient) after syncope work up completed    # HTN  - orthostatics VS positive  - repeat orthostatic VS today  -  reschedule lisinopril to PM. d/c HCTZ as per EP recs    # HLD  - c/w atorvastatin 10 qd    PLAN:  f/u orthostatics,  BARBARA    # DVT PPX: lovenox  # GI PPX: protonix  # Diet: Regular  FULL CODE

## 2021-08-01 NOTE — PROGRESS NOTE ADULT - ASSESSMENT
Cards: Dr Nicolas    59yo Male with GERD, HLD, HTN, admitted with recurrent syncope  prior neuro work up negative  Echo with possible bicuspid AV  COVID negative 7/31    syncope  HTN  orthostatic hypotension    con't tele  orthostatic - d/c HCTZ, give Lisinopril in PM  PO and IV hydration  con't serial orthostatic vitals   Dr Nicolas plan BARBARA for tomorrow to assess aortic valve  Loop recorder placement for long term cardiac monitoring for arrhythmias as etiology of patient's symptoms   risks and benefits explained to patient and family, all in agreement  consent in the chart   will plan after BARBARA tomorrow  No need for NPO for loop, only for BARBARA  Maintain electrolytes K>4.0 Mg >2.0

## 2021-08-01 NOTE — PROGRESS NOTE ADULT - SUBJECTIVE AND OBJECTIVE BOX
SUBJECTIVE:    Patient is a 60y old Male who presents with a chief complaint of syncope (31 Jul 2021 18:06)    Currently admitted to medicine with the primary diagnosis of Syncope.  Today is hospital day 1d. This morning he is resting comfortably in bed and reports no new issues or overnight events.     PAST MEDICAL & SURGICAL HISTORY  HTN (hypertension)    Hyperlipidemia    No significant past surgical history    ALLERGIES:  No Known Allergies    MEDICATIONS:  STANDING MEDICATIONS  aspirin  chewable 81 milliGRAM(s) Oral daily  atorvastatin 10 milliGRAM(s) Oral at bedtime  lisinopril 10 milliGRAM(s) Oral daily  pantoprazole    Tablet 40 milliGRAM(s) Oral before breakfast  sodium chloride 0.9%. 1000 milliLiter(s) IV Continuous <Continuous>    PRN MEDICATIONS    VITALS:   T(F): 97.8  HR: 79  BP: 138/64  RR: 18  SpO2: 98%    LABS:                        13.2   7.60  )-----------( 260      ( 01 Aug 2021 07:23 )             38.9     08-01    138  |  104  |  13  ----------------------------<  101<H>  4.6   |  26  |  0.9    Ca    9.5      01 Aug 2021 07:23  Mg     2.1     08-01    TPro  6.7  /  Alb  4.3  /  TBili  0.7  /  DBili  x   /  AST  21  /  ALT  15  /  AlkPhos  83  08-01    PT/INR - ( 31 Jul 2021 03:29 )   PT: 11.70 sec;   INR: 1.02 ratio         PTT - ( 31 Jul 2021 03:29 )  PTT:27.2 sec      Troponin T, Serum: <0.01 ng/mL (08-01-21 @ 07:23)      CARDIAC MARKERS ( 01 Aug 2021 07:23 )  x     / <0.01 ng/mL / x     / x     / x      CARDIAC MARKERS ( 31 Jul 2021 03:29 )  x     / <0.01 ng/mL / x     / x     / x          RADIOLOGY:  < from: Xray Chest 1 View- PORTABLE-Urgent (Xray Chest 1 View- PORTABLE-Urgent .) (07.31.21 @ 04:55) >  Impression:    No radiographic evidence of acute cardiopulmonary disease.      < end of copied text >    < from: CT Head No Cont (07.31.21 @ 07:37) >  IMPRESSION:    No acute intracranial pathology. No evidence of midline shift, mass effect or intracranial hemorrhage.    < end of copied text >  < from: TTE Echo Complete w/o Contrast w/ Doppler (05.04.21 @ 11:42) >    Summary:   1. Normal global left ventricular systolic function.   2. Mild to moderately enlarged left atrium.   3. Normal right atrial size.   4. Trace mitral valve regurgitation.   5. Mild aortic regurgitation.   6. Sclerotic aortic valve with normal opening.   7. Cant exclude a bicuspid valve      Normal ascending aorta.   8. ASc aorta normal.   9. Mitral valve mean gradient is 1.0 mmHg consistent with normal mitral stenosis.      < end of copied text >    PHYSICAL EXAM:  GEN: No acute distress  LUNGS: Clear to auscultation bilaterally, no wheezes, rales, rhonchi  HEART: Regular rate and rhythm, +s1 s2  ABD: Soft, non-tender, non-distended. +BS  EXT: no LE edema, Right sided splint on foot. Skin Intact.   NEURO: AAOX3, no focal deficits     SUBJECTIVE:    Patient is a 60y old Male who presents with a chief complaint of syncope (31 Jul 2021 18:06)    Currently admitted to medicine with the primary diagnosis of Syncope.  Today is hospital day 1d. This morning he is resting comfortably in bed and reports no new issues or overnight events.       Attending Note: Pt seen and examined at bedside. No cp or sob.    PAST MEDICAL & SURGICAL HISTORY  HTN (hypertension)    Hyperlipidemia    No significant past surgical history    ALLERGIES:  No Known Allergies    MEDICATIONS:  STANDING MEDICATIONS  aspirin  chewable 81 milliGRAM(s) Oral daily  atorvastatin 10 milliGRAM(s) Oral at bedtime  lisinopril 10 milliGRAM(s) Oral daily  pantoprazole    Tablet 40 milliGRAM(s) Oral before breakfast  sodium chloride 0.9%. 1000 milliLiter(s) IV Continuous <Continuous>    PRN MEDICATIONS    VITALS:   T(F): 97.8  HR: 79  BP: 138/64  RR: 18  SpO2: 98%    LABS:                        13.2   7.60  )-----------( 260      ( 01 Aug 2021 07:23 )             38.9     08-01    138  |  104  |  13  ----------------------------<  101<H>  4.6   |  26  |  0.9    Ca    9.5      01 Aug 2021 07:23  Mg     2.1     08-01    TPro  6.7  /  Alb  4.3  /  TBili  0.7  /  DBili  x   /  AST  21  /  ALT  15  /  AlkPhos  83  08-01    PT/INR - ( 31 Jul 2021 03:29 )   PT: 11.70 sec;   INR: 1.02 ratio         PTT - ( 31 Jul 2021 03:29 )  PTT:27.2 sec      Troponin T, Serum: <0.01 ng/mL (08-01-21 @ 07:23)      CARDIAC MARKERS ( 01 Aug 2021 07:23 )  x     / <0.01 ng/mL / x     / x     / x      CARDIAC MARKERS ( 31 Jul 2021 03:29 )  x     / <0.01 ng/mL / x     / x     / x          RADIOLOGY:  < from: Xray Chest 1 View- PORTABLE-Urgent (Xray Chest 1 View- PORTABLE-Urgent .) (07.31.21 @ 04:55) >  Impression:    No radiographic evidence of acute cardiopulmonary disease.      < end of copied text >    < from: CT Head No Cont (07.31.21 @ 07:37) >  IMPRESSION:    No acute intracranial pathology. No evidence of midline shift, mass effect or intracranial hemorrhage.    < end of copied text >  < from: TTE Echo Complete w/o Contrast w/ Doppler (05.04.21 @ 11:42) >    Summary:   1. Normal global left ventricular systolic function.   2. Mild to moderately enlarged left atrium.   3. Normal right atrial size.   4. Trace mitral valve regurgitation.   5. Mild aortic regurgitation.   6. Sclerotic aortic valve with normal opening.   7. Cant exclude a bicuspid valve      Normal ascending aorta.   8. ASc aorta normal.   9. Mitral valve mean gradient is 1.0 mmHg consistent with normal mitral stenosis.      < end of copied text >    PHYSICAL EXAM:  GEN: No acute distress  LUNGS: Clear to auscultation bilaterally, no wheezes, rales, rhonchi  HEART: Regular rate and rhythm, +s1 s2  ABD: Soft, non-tender, non-distended. +BS  EXT: no LE edema, Right sided splint on foot. Skin Intact.   NEURO: AAOX3, no focal deficits

## 2021-08-02 PROBLEM — E78.5 HYPERLIPIDEMIA, UNSPECIFIED: Chronic | Status: ACTIVE | Noted: 2021-07-31

## 2021-08-02 LAB
ALBUMIN SERPL ELPH-MCNC: 4.1 G/DL — SIGNIFICANT CHANGE UP (ref 3.5–5.2)
ALP SERPL-CCNC: 79 U/L — SIGNIFICANT CHANGE UP (ref 30–115)
ALT FLD-CCNC: 16 U/L — SIGNIFICANT CHANGE UP (ref 0–41)
ANION GAP SERPL CALC-SCNC: 7 MMOL/L — SIGNIFICANT CHANGE UP (ref 7–14)
AST SERPL-CCNC: 20 U/L — SIGNIFICANT CHANGE UP (ref 0–41)
B BURGDOR C6 AB SER-ACNC: NEGATIVE — SIGNIFICANT CHANGE UP
B BURGDOR IGG+IGM SER-ACNC: 0.08 INDEX — SIGNIFICANT CHANGE UP (ref 0.01–0.89)
BASOPHILS # BLD AUTO: 0.04 K/UL — SIGNIFICANT CHANGE UP (ref 0–0.2)
BASOPHILS NFR BLD AUTO: 0.6 % — SIGNIFICANT CHANGE UP (ref 0–1)
BILIRUB SERPL-MCNC: 0.7 MG/DL — SIGNIFICANT CHANGE UP (ref 0.2–1.2)
BUN SERPL-MCNC: 11 MG/DL — SIGNIFICANT CHANGE UP (ref 10–20)
CALCIUM SERPL-MCNC: 9.5 MG/DL — SIGNIFICANT CHANGE UP (ref 8.5–10.1)
CHLORIDE SERPL-SCNC: 107 MMOL/L — SIGNIFICANT CHANGE UP (ref 98–110)
CO2 SERPL-SCNC: 25 MMOL/L — SIGNIFICANT CHANGE UP (ref 17–32)
COVID-19 SPIKE DOMAIN AB INTERP: POSITIVE
COVID-19 SPIKE DOMAIN ANTIBODY RESULT: >250 U/ML — HIGH
CREAT SERPL-MCNC: 0.9 MG/DL — SIGNIFICANT CHANGE UP (ref 0.7–1.5)
EOSINOPHIL # BLD AUTO: 0.14 K/UL — SIGNIFICANT CHANGE UP (ref 0–0.7)
EOSINOPHIL NFR BLD AUTO: 2 % — SIGNIFICANT CHANGE UP (ref 0–8)
FERRITIN SERPL-MCNC: 207 NG/ML — SIGNIFICANT CHANGE UP (ref 30–400)
GLUCOSE SERPL-MCNC: 100 MG/DL — HIGH (ref 70–99)
HCT VFR BLD CALC: 37.8 % — LOW (ref 42–52)
HCV AB S/CO SERPL IA: 0.04 COI — SIGNIFICANT CHANGE UP
HCV AB SERPL-IMP: SIGNIFICANT CHANGE UP
HGB BLD-MCNC: 12.9 G/DL — LOW (ref 14–18)
IMM GRANULOCYTES NFR BLD AUTO: 0.3 % — SIGNIFICANT CHANGE UP (ref 0.1–0.3)
LYMPHOCYTES # BLD AUTO: 1.64 K/UL — SIGNIFICANT CHANGE UP (ref 1.2–3.4)
LYMPHOCYTES # BLD AUTO: 23.8 % — SIGNIFICANT CHANGE UP (ref 20.5–51.1)
MAGNESIUM SERPL-MCNC: 2 MG/DL — SIGNIFICANT CHANGE UP (ref 1.8–2.4)
MCHC RBC-ENTMCNC: 31.1 PG — HIGH (ref 27–31)
MCHC RBC-ENTMCNC: 34.1 G/DL — SIGNIFICANT CHANGE UP (ref 32–37)
MCV RBC AUTO: 91.1 FL — SIGNIFICANT CHANGE UP (ref 80–94)
MONOCYTES # BLD AUTO: 0.69 K/UL — HIGH (ref 0.1–0.6)
MONOCYTES NFR BLD AUTO: 10 % — HIGH (ref 1.7–9.3)
NEUTROPHILS # BLD AUTO: 4.36 K/UL — SIGNIFICANT CHANGE UP (ref 1.4–6.5)
NEUTROPHILS NFR BLD AUTO: 63.3 % — SIGNIFICANT CHANGE UP (ref 42.2–75.2)
NRBC # BLD: 0 /100 WBCS — SIGNIFICANT CHANGE UP (ref 0–0)
PLATELET # BLD AUTO: 256 K/UL — SIGNIFICANT CHANGE UP (ref 130–400)
POTASSIUM SERPL-MCNC: 5 MMOL/L — SIGNIFICANT CHANGE UP (ref 3.5–5)
POTASSIUM SERPL-SCNC: 5 MMOL/L — SIGNIFICANT CHANGE UP (ref 3.5–5)
PROT SERPL-MCNC: 6.4 G/DL — SIGNIFICANT CHANGE UP (ref 6–8)
RBC # BLD: 4.15 M/UL — LOW (ref 4.7–6.1)
RBC # FLD: 11.2 % — LOW (ref 11.5–14.5)
SARS-COV-2 IGG+IGM SERPL QL IA: >250 U/ML — HIGH
SARS-COV-2 IGG+IGM SERPL QL IA: POSITIVE
SODIUM SERPL-SCNC: 139 MMOL/L — SIGNIFICANT CHANGE UP (ref 135–146)
T4 FREE+ TSH PNL SERPL: 0.58 UIU/ML — SIGNIFICANT CHANGE UP (ref 0.27–4.2)
WBC # BLD: 6.89 K/UL — SIGNIFICANT CHANGE UP (ref 4.8–10.8)
WBC # FLD AUTO: 6.89 K/UL — SIGNIFICANT CHANGE UP (ref 4.8–10.8)

## 2021-08-02 PROCEDURE — 93010 ELECTROCARDIOGRAM REPORT: CPT

## 2021-08-02 PROCEDURE — 33285 INSJ SUBQ CAR RHYTHM MNTR: CPT

## 2021-08-02 PROCEDURE — 99233 SBSQ HOSP IP/OBS HIGH 50: CPT

## 2021-08-02 RX ORDER — CEPHALEXIN 500 MG
500 CAPSULE ORAL ONCE
Refills: 0 | Status: COMPLETED | OUTPATIENT
Start: 2021-08-02 | End: 2021-08-02

## 2021-08-02 RX ADMIN — ATORVASTATIN CALCIUM 10 MILLIGRAM(S): 80 TABLET, FILM COATED ORAL at 21:57

## 2021-08-02 RX ADMIN — LISINOPRIL 10 MILLIGRAM(S): 2.5 TABLET ORAL at 21:56

## 2021-08-02 RX ADMIN — PANTOPRAZOLE SODIUM 40 MILLIGRAM(S): 20 TABLET, DELAYED RELEASE ORAL at 05:42

## 2021-08-02 RX ADMIN — Medication 500 MILLIGRAM(S): at 18:10

## 2021-08-02 RX ADMIN — ENOXAPARIN SODIUM 40 MILLIGRAM(S): 100 INJECTION SUBCUTANEOUS at 21:57

## 2021-08-02 RX ADMIN — Medication 650 MILLIGRAM(S): at 21:55

## 2021-08-02 RX ADMIN — Medication 81 MILLIGRAM(S): at 13:53

## 2021-08-02 RX ADMIN — Medication 650 MILLIGRAM(S): at 22:25

## 2021-08-02 NOTE — PROGRESS NOTE ADULT - SUBJECTIVE AND OBJECTIVE BOX
Pt seen and examined at bedside. No CP or SOB. No overnight events      PAST MEDICAL & SURGICAL HISTORY:  HTN (hypertension)    Hyperlipidemia    No significant past surgical history        VITAL SIGNS (Last 24 hrs):  T(C): 36.1 (21 @ 13:27), Max: 36.1 (21 @ 20:16)  HR: 54 (21 @ 12:08) (54 - 64)  BP: 137/67 (21 @ 12:08) (137/64 - 137/67)  RR: 18 (21 @ 13:27) (18 - 19)  SpO2: 100% (21 @ 10:46) (99% - 100%)  Wt(kg): --  Daily Height in cm: 190.5 (02 Aug 2021 10:46)    Daily Weight in k.6 (02 Aug 2021 05:00)    I&O's Summary    01 Aug 2021 07:01  -  02 Aug 2021 07:00  --------------------------------------------------------  IN: 1140 mL / OUT: 0 mL / NET: 1140 mL    02 Aug 2021 07:01  -  02 Aug 2021 15:15  --------------------------------------------------------  IN: 225 mL / OUT: 0 mL / NET: 225 mL        PHYSICAL EXAM:  GENERAL: NAD, well-developed  HEAD:  Atraumatic, Normocephalic  EYES: EOMI, PERRLA, conjunctiva and sclera clear  NECK: Supple, No JVD  CHEST/LUNG: Clear to auscultation bilaterally; No wheeze  HEART: Regular rate and rhythm; No murmurs, rubs, or gallops  ABDOMEN: Soft, Nontender, Nondistended; Bowel sounds present  EXTREMITIES:  2+ Peripheral Pulses, No clubbing, cyanosis, or edema  PSYCH: AAOx3  NEUROLOGY: non-focal  SKIN: No rashes or lesions    Labs Reviewed  Spoke to patient in regards to abnormal labs.    CBC Full  -  ( 02 Aug 2021 04:30 )  WBC Count : 6.89 K/uL  Hemoglobin : 12.9 g/dL  Hematocrit : 37.8 %  Platelet Count - Automated : 256 K/uL  Mean Cell Volume : 91.1 fL  Mean Cell Hemoglobin : 31.1 pg  Mean Cell Hemoglobin Concentration : 34.1 g/dL  Auto Neutrophil # : 4.36 K/uL  Auto Lymphocyte # : 1.64 K/uL  Auto Monocyte # : 0.69 K/uL  Auto Eosinophil # : 0.14 K/uL  Auto Basophil # : 0.04 K/uL  Auto Neutrophil % : 63.3 %  Auto Lymphocyte % : 23.8 %  Auto Monocyte % : 10.0 %  Auto Eosinophil % : 2.0 %  Auto Basophil % : 0.6 %    BMP:     @ 04:30    Blood Urea Nitrogen - 11  Calcium - 9.5  Carbond Dioxide - 25  Chloride - 107  Creatinine - 0.9  Glucose - 100  Potassium - 5.0  Sodium - 139      Hemoglobin A1c -   PT/INR - ( 2021 03:29 )   PT: 11.70 sec;   INR: 1.02 ratio         PTT - ( 2021 03:29 )  PTT:27.2 sec  Urine Culture:        COVID Labs      D-Dimer:    Ferritin, Serum: 207 ng/mL (21 @ 07:23)      Imaging reviewed      MEDICATIONS  (STANDING):  aspirin  chewable 81 milliGRAM(s) Oral daily  atorvastatin 10 milliGRAM(s) Oral at bedtime  enoxaparin Injectable 40 milliGRAM(s) SubCutaneous daily  lisinopril 10 milliGRAM(s) Oral at bedtime  pantoprazole    Tablet 40 milliGRAM(s) Oral before breakfast  sodium chloride 0.9%. 1000 milliLiter(s) (75 mL/Hr) IV Continuous <Continuous>    MEDICATIONS  (PRN):  acetaminophen   Tablet .. 650 milliGRAM(s) Oral every 6 hours PRN Mild Pain (1 - 3)

## 2021-08-02 NOTE — PROGRESS NOTE ADULT - SUBJECTIVE AND OBJECTIVE BOX
Electrophysiology Brief Post-Op Note    I have personally seen and examined the patient.  I agree with the history and physical which I have reviewed and noted any changes below.  08-02-21 @ 18:00    PRE-OP DIAGNOSIS: Syncope     POST-OP DIAGNOSIS:  Syncope    PROCEDURE: Loop Implant    Physician: Dr. Hogan  Assistant: GUILHERME Jacobs    ESTIMATED BLOOD LOSS:  2    mL    ANESTHESIA TYPE:  [  ]General Anesthesia  [  ] Sedation  [X  ] Local/Regional    CONDITION  [  ] Critical  [  ] Serious  [  ]Fair  [ X ]Good    SPECIMENS REMOVED (IF APPLICABLE):  none    IMPLANTS (IF APPLICABLE)  Loop Recorder (Medtronic)    FINDINGS  PLAN OF CARE  - F/U 3-4 weeks  - May remove bandaid tomorrow  - May shower in 48 hours                   Electrophysiology Brief Post-Op Note    I have personally seen and examined the patient.  I agree with the history and physical which I have reviewed and noted any changes below.  08-02-21 @ 18:00    PRE-OP DIAGNOSIS: Syncope     POST-OP DIAGNOSIS:  Syncope    PROCEDURE: Loop Implant    Physician: Dr. Hogan  Assistant: GUILHERME Jacobs    ESTIMATED BLOOD LOSS:  2    mL    ANESTHESIA TYPE:  [  ]General Anesthesia  [  ] Sedation  [X  ] Local/Regional    CONDITION  [  ] Critical  [  ] Serious  [  ]Fair  [ X ]Good    SPECIMENS REMOVED (IF APPLICABLE):  none    IMPLANTS (IF APPLICABLE)  Loop Recorder (Medtronic)    FINDINGS  PLAN OF CARE  - F/U 3-4 weeks with Dr. Reyes office  - May remove bandaid tomorrow  - May shower in 48 hours

## 2021-08-02 NOTE — CHART NOTE - NSCHARTNOTEFT_GEN_A_CORE
EP PROCEDURE NOTE    Date of Procedure: 08-02-21  EP Attending: Dr. Hogan  Assistant: GUILHERME Jacobs  Referring Physician: Dr. Nicolas  Procedure: Loop Recorder Implant    Indication: 60y Male with history of recurrent syncope referred for implantable loop recorder.     Anesthesia: Local    EQUIPMENT IMPLANTED  : Medtronic Linq  Model Number: [id number]  Serial Number: [id number]    PROCEDURE DESCRIPTION  The patient was brought to the electrophysiology procedure area in a non-sedated state and received preoperative antibiotics. Informed, written consent was obtained prior to the procedure. The left anterior chest region was prepped and cleaned from the nipple to the upper left clavicular border with serial applications of Chlorhexidine. Patient was then covered with sterile drapes in the usual manner. Blood pressure, oxygenation, and level of comfort were stable throughout.     Following infiltration with local anesthetic, a 1-cm incision was made along the left sternal border at the fourth intercostal space. Using the tunneling device the implantable loop recorder was inserted into the subcutaneous tissue. Direct pressure was applied to the wound to obtain hemostasis. The wound was approximated with Dermabond. Steri-strips and a dry, sterile dressing were placed over the wound. R waves were noted to be 0.23 mV.     The patient tolerated the procedure well.     COMPLICATIONS  No immediate complications    CONCLUSIONS  Successful loop recorder implant    EBL: 2 cc

## 2021-08-02 NOTE — PROGRESS NOTE ADULT - SUBJECTIVE AND OBJECTIVE BOX
JEFF GOODWIN 60y Male  MRN#: [id number]   CODE STATUS: Full code    Hospital Day: 2d    Pt is currently admitted with the primary diagnosis of Syncope      SUBJECTIVE  Hospital Course  60 year old male with hx of recently diagnosed HTN, HLD, chronic cough since being involved in , GERD presents acutely with an episode of syncope with generalized weakness, decreased PO intake, black stools, and nausea of one month's duration.  Patient states he went to get a glass of water last night and the last thing he remembers is being woken up by his daughter on the ground.  His daughter heard him fall and woke him up immediately.  He thinks he fell on his right side as that's where he's feeling pain.  Of note the EMS told him his blood pressure was 77/40 when he came, his family told him he was sweating.  He was recently diagnosed with hypertension and started on lisinopril after having lower extremity edema with amlodipine.  Patient has also been endorsing nausea for at least a month's duration along with early satiety.  He had an EGD (for chronic cough) 3 years ago and was diagnosed with GERD, he had a colonoscopy at the same time and was told it was clear with benign polyps.  He has been endorsing formed black stools for at least a month's duration as well and has not followed up with GI since, because of his decreased appetite he lost about 7 pounds in that time.    He currently denies chest pain, denies abdominal pain, denies diarrhea.    Triage vitals: BP 99/55  HR 67  RR 20  Temp 97.8  SpO2 98% on room air (2021 12:57)      Overnight events/Subjective complaints  Patient was seen at the bedside this morning. He is lying comfortably on the bed. There were no acute events overnight.   He denies any chest pain, shortness of breath, cough, fever, chills, abdominal pain, nausea, vomiting, diarrhea, dizziness or headache.                                               ----------------------------------------------------------  OBJECTIVE  PAST MEDICAL & SURGICAL HISTORY  HTN (hypertension)    Hyperlipidemia    No significant past surgical history                                            -----------------------------------------------------------  ALLERGIES:  No Known Allergies                                          ------------------------------------------------------------    HOME MEDICATIONS  Home Medications:  aspirin 81 mg oral tablet, chewable: 1 tab(s) orally once a day (2021 12:56)  atorvastatin 10 mg oral tablet: 1 tab(s) orally once a day (2021 12:56)  lisinopril 10 mg oral tablet: 1 tab(s) orally once a day (2021 12:55)  lisinopril 2.5 mg oral tablet: 1 tab(s) orally once a day (2021 12:55)  omeprazole 40 mg oral delayed release capsule: 1 cap(s) orally once a day (2021 12:56)                           MEDICATIONS:  STANDING MEDICATIONS  aspirin  chewable 81 milliGRAM(s) Oral daily  atorvastatin 10 milliGRAM(s) Oral at bedtime  enoxaparin Injectable 40 milliGRAM(s) SubCutaneous daily  lisinopril 10 milliGRAM(s) Oral at bedtime  pantoprazole    Tablet 40 milliGRAM(s) Oral before breakfast    PRN MEDICATIONS  acetaminophen   Tablet .. 650 milliGRAM(s) Oral every 6 hours PRN                                            ------------------------------------------------------------  VITAL SIGNS: Last 24 Hours  T(F): 98.7 (02 Aug 2021 20:03), Max: 98.7 (02 Aug 2021 20:03)  HR: 69 (02 Aug 2021 20:03) (54 - 69)  BP: 155/77 (02 Aug 2021 20:03) (137/64 - 155/77)  RR: 18 (02 Aug 2021 20:03) (18 - 19)  SpO2: 100% (02 Aug 2021 10:46) (100% - 100%)      21 @ 07:01  -  21 @ 07:00  --------------------------------------------------------  IN: 1140 mL / OUT: 0 mL / NET: 1140 mL    21 @ 07:  -  21 @ 22:01  --------------------------------------------------------  IN: 375 mL / OUT: 0 mL / NET: 375 mL      Daily Height in cm: 190.5 (02 Aug 2021 10:46), Height in cm: 190.5 (2021 09:32), Height in cm: 190.5 (2021 01:00)    Daily Weight in k.6 (02 Aug 2021 05:00), Weight in k.1 (01 Aug 2021 05:11)                                        --------------------------------------------------------------  LABS:                        12.9   6.89  )-----------( 256      ( 02 Aug 2021 04:30 )             37.8                         13.2   7.60  )-----------( 260      ( 01 Aug 2021 07:23 )             38.9                         12.9   10.23 )-----------( 258      ( 2021 03:29 )             38.7      @ 04:30    139  |  107  |  11  ----------------------------<  100<H>  5.0   |  25  |  0.9   @ 07:23    138  |  104  |  13  ----------------------------<  101<H>  4.6   |  26  |  0.9   @ 03:29    137  |  101  |  19  ----------------------------<  113<H>  4.5   |  29  |  1.1    Ca    9.5       @ 04:30  Ca    9.5       @ 07:23  Ca    9.3       @ 03:29  Mg     2.0       Mg     2.1       Mg     2.1         TPro  6.4  /  Alb  4.1  /  TBili  0.7  /  DBili  x   /  AST  20  /  ALT  16  /  AlkPhos  79    TPro  6.7  /  Alb  4.3  /  TBili  0.7  /  DBili  x   /  AST  21  /  ALT  15  /  AlkPhos  83    TPro  6.9  /  Alb  4.5  /  TBili  0.4  /  DBili  x   /  AST  18  /  ALT  14  /  AlkPhos  90      PT/INR - ( 2021 03:29 )   PT: 11.70 sec;   INR: 1.02 ratio         PTT - ( 2021 03:29 )  PTT:27.2 sec      Troponin T, Serum: <0.01 ( @ 07:23)    CARDIAC MARKERS ( 21 @ 07:23 )  x     / <0.01 ng/mL / x     / x     / x      CARDIAC MARKERS ( 21 @ 03:29 )  x     / <0.01 ng/mL / x     / x     / x                                                  -------------------------------------------------------------  RADIOLOGY:  CT Head No Cont (21 @ 07:37)  IMPRESSION:  No acute intracranial pathology. No evidence of midline shift, mass effect or intracranial hemorrhage.                                                --------------------------------------------------------------    PHYSICAL EXAM:  General: No acute distress; Pallor (-), Icterus (-), Cyanosis (-), Clubbing (-)  HEENT: Normocephalic, atraumatic, PERRLA, EOMI  PULM: Bilaterally equal and clear breath sounds, wheeze (-), rubs (-), crackles (-)  CVS: Normal S1 and S2, murmurs (-), rubs (-), gallops (-)   GI: Soft, nondistended, nontender, BS +  MSK: Edema (-), no muscle, bone or joint tenderness noted  SKIN: Warm and well perfused, no rashes noted  NEURO:  Alert and Oriented x 3; No gross focal neurological deficit noted                                          --------------------------------------------------------------

## 2021-08-02 NOTE — CHART NOTE - NSCHARTNOTEFT_GEN_A_CORE
POST OPERATIVE PROCEDURAL DOCUMENTATION  PRE-OP DIAGNOSIS: TIA rule cardioembolic source, Evaluate aortic valve    POST-OP DIAGNOSIS: No cardioembolic source of CVA/TIA is identified.     PROCEDURE: Transesophageal echocardiogram    Primary Physician: Dr. Nicolas  Assistant: Austin Mendes    ANESTHESIA TYPE  [  ] General Anesthesia  [ x ] Conscious Sedation  [  ] Local/Regional    CONDITION  [  ] Critical  [  ] Serious  [x] Fair  [  ] Good    SPECIMENS REMOVED (IF APPLICABLE): N/A    IMPLANTS (IF APPLICABLE): None    ESTIMATED BLOOD LOSS: None    COMPLICATIONS: None      FINDINGS:    After risks and benefits of procedures were explained, informed consent was obtained and placed in chart. Refer to Anesthesia note for sedation details.  The BARBARA probe was passed into the esophagus without difficulty.  Transesophageal and transgastric images were obtained.  The BARBARA probe was removed without difficulty and examined.  There was no evidence for bleeding.  The patient tolerated the procedure well without any immediate BARBARA-related complications.      Preliminary Findings:  LA and RA: Normal size.  SHERIF: Left atrial appendage was clear of clot and spontaneous echo contrast. Good velocities across SHERIF.   LV: LVEF was estimated at 55-65%.  RV: Normal size and function.  MV: Sclerotic mitral leaflets at the tips. Trace MR. No evidence for MS.   AV: Trileaflet. Sclerotic non-coronary cusp and restricted motion. Mild prolapse of left coronary cusp associated with trace AI.  TV: No TR.   IAS: No PFO. NO R-> L shunt on color doppler or injection of agitated saline contrast.  Aorta: Normal aortic roots, ascending aorta and descending aorta.    DIAGNOSIS/IMPRESSION:  No cardioembolic source of CVA/TIA is identified.     PLAN OF CARE:  [x] Continue treatment for TIA as per medical team.   Results of procedure/ plan of care discussed with patient/  in detail. POST OPERATIVE PROCEDURAL DOCUMENTATION  PRE-OP DIAGNOSIS: TIA rule cardioembolic source, Evaluate aortic valve    POST-OP DIAGNOSIS: No cardioembolic source of CVA/TIA is identified.     PROCEDURE: Transesophageal echocardiogram    Primary Physician: Dr. Nicolas  Assistant: Austin Mendes    ANESTHESIA TYPE  [  ] General Anesthesia  [ x ] Conscious Sedation  [  ] Local/Regional    CONDITION  [  ] Critical  [  ] Serious  [x] Fair  [  ] Good    SPECIMENS REMOVED (IF APPLICABLE): N/A    IMPLANTS (IF APPLICABLE): None    ESTIMATED BLOOD LOSS: None    COMPLICATIONS: None      FINDINGS:    After risks and benefits of procedures were explained, informed consent was obtained and placed in chart. Refer to Anesthesia note for sedation details.  The BARBARA probe was passed into the esophagus without difficulty.  Transesophageal and transgastric images were obtained.  The BARBARA probe was removed without difficulty and examined.  There was no evidence for bleeding.  The patient tolerated the procedure well without any immediate BARBARA-related complications.      Preliminary Findings:  LA and RA: Normal size.  SHERIF: Left atrial appendage was clear of clot and spontaneous echo contrast. Good velocities across SHERIF.   LV: LVEF was estimated at 55-65%.  RV: Normal size and function.  MV: Sclerotic mitral leaflets at the tips. Trace MR. No evidence for MS.   AV: Trileaflet. Sclerotic non-coronary cusp and restricted motion. Mild prolapse of left coronary cusp associated with trace AI.  TV: No TR.   IAS: No PFO. NO R-> L shunt on color doppler or injection of agitated saline contrast.  Aorta: Normal aortic roots, ascending aorta and descending aorta.    DIAGNOSIS/IMPRESSION:  No cardioembolic source of CVA / TIA is identified.     PLAN OF CARE:  [x] Continue treatment for TIA as per medical team.   Results of procedure/ plan of care discussed with patient/  in detail.

## 2021-08-02 NOTE — PROGRESS NOTE ADULT - ASSESSMENT
60 year old male with hx of recently diagnosed HTN, HLD, chronic cough since being involved in 9/11, GERD presents acutely with an episode of syncope with generalized weakness, decreased PO intake, black stools, and nausea of one month's duration.     # Syncope - Orthostatic hypotension d/t Diuresis and decreased PO intake  - CT head negative, had a syncopal episode in May as well CT head and MRI were negative  - was told that he was sweaty and BP was 77/40 done by EMS at his house right after his fall  - admits to decreased PO intake because of reduced appetite and early satiety of over a month duration / patient has also been having black formed stools  - because of recurrent syncope and sudden collapse, some suspicion for cardiac cause, patient is juaquin in the 50s at rest  - s/p Loop recorder by EP on 08/02  - BARBARA (08/02): LVEF 50-65%; No cardioembolic source of CVA / TIA is identified  - orthostatics VS positive - Repeat orthostatics negative after discontinuing HCTZ and rescheduling lisinopril to PM  - GI cs appreciated plan for EGD ( inpatient vs outpatient) after syncope work up completed  - Lyme titer - negative; Follow up TSH    # Normocytic anemia with nausea / early satiety / black formed stools  - Symptoms have been for about a month he states, daughter states it may have been longer, has hx of GERD  - had EGD in 2018, does not remember what findings were / consulted GI due to alarm symptoms, has mild anemia  - f/u retic and iron studies for anemia  - GILBERTO negative in the ED  - GI cs appreciated- plan for EGD once cleared by Cardio    # HTN  - reschedule lisinopril to PM. d/c HCTZ as per EP recs  - Pt Orthostats improved, continue current meds    # HLD  - c/w atorvastatin 10 qd    # Diet: DASH/TLC  # Activity: IAT  # DVT PPX: Lovenox  # GI PPX: PPI  # Code status: Full code

## 2021-08-02 NOTE — CHART NOTE - NSCHARTNOTEFT_GEN_A_CORE
PACU ANESTHESIA ADMISSION NOTE      Procedure:   Post op diagnosis:      ____  Intubated  TV:______       Rate: ______      FiO2: ______    __x__  Patent Airway    __x__  Full return of protective reflexes    __x__  Full recovery from anesthesia / back to baseline     Vitals:   T:   36.2        R:  11                BP: 104/71                Sat: 100                  P: 74      Mental Status:  _x___ Awake   _x____ Alert   _____ Drowsy   _____ Sedated    Nausea/Vomiting:  _x___ NO  ______Yes,   See Post - Op Orders          Pain Scale (0-10):  __x___    Treatment: ____ None    ____ See Post - Op/PCA Orders    Post - Operative Fluids:   _x___ Oral   ____ See Post - Op Orders    Plan: Discharge:   ____Home       _x____Floor     _____Critical Care    _____  Other:_________________    Comments: tolerated procedure well

## 2021-08-02 NOTE — PROGRESS NOTE ADULT - ASSESSMENT
60 year old male with hx of recently diagnosed HTN, HLD, chronic cough since being involved in 9/11, GERD presents acutely with an episode of syncope with generalized weakness, decreased PO intake, black stools, and nausea of one month's duration    # Syncope possibly vasovagal secondary to decreased PO intake and new HTN meds, rule out arrhythmia  - CT head negative, had a syncopal episode in May as well CT head and MRI were negative  - was told that he was sweaty and BP was 77/40 done by EMS at his house right after his fall  - admits to decreased PO intake because of reduced appetite and early satiety of over a month duration / patient has also been having black formed stools, GILBERTO negative  - because of recurrent syncope and sudden collapse, some suspicion for cardiac cause, patient is juaquin in the 50s at rest  - echo in May showed normal ventricular systolic function  - orthostatics VS positive this AM  - repeat ortho VS  - as per EP, will start on IV fluids, reschedule lisinopril to PM. d/c HCTZ  - f/u EP recs  - GI cs appreciated plan for EGD ( inpatient vs outpatient) after syncope work up completed  - f/u TSH / lyme titers  - monitor on tele  - Cardio doing BARBARA today     # Normocytic anemia with nausea / early satiety / black formed stools  - symptoms have been for about a month he states, daughter states it may have been longer, has hx of GERD  - had EGD in 2018, does not remember what findings were / consulted GI due to alarm symptoms, has mild anemia  - f/u retic and iron studies for anemia  - GILBERTO negative in the ED  - GI cs appreciated- plan for EGD once cleared by Cardio    # HTN  - orthostatics VS positive  - repeat orthostatic VS today  -  reschedule lisinopril to PM. d/c HCTZ as per EP recs  - Pt Orthostats improved, continue current meds.     # HLD  - c/w atorvastatin 10 qd    PLAN:  f/u orthostatics,  BARBARA    #Progress Note Handoff  Pending (specify): follow up cardiology BARBARA and GI   Family discussion: house staff updated pt family  Disposition: F9

## 2021-08-03 LAB
ALBUMIN SERPL ELPH-MCNC: 4.4 G/DL — SIGNIFICANT CHANGE UP (ref 3.5–5.2)
ALP SERPL-CCNC: 83 U/L — SIGNIFICANT CHANGE UP (ref 30–115)
ALT FLD-CCNC: 16 U/L — SIGNIFICANT CHANGE UP (ref 0–41)
ANION GAP SERPL CALC-SCNC: 10 MMOL/L — SIGNIFICANT CHANGE UP (ref 7–14)
ANION GAP SERPL CALC-SCNC: 7 MMOL/L — SIGNIFICANT CHANGE UP (ref 7–14)
AST SERPL-CCNC: 17 U/L — SIGNIFICANT CHANGE UP (ref 0–41)
BASOPHILS # BLD AUTO: 0.04 K/UL — SIGNIFICANT CHANGE UP (ref 0–0.2)
BASOPHILS NFR BLD AUTO: 0.5 % — SIGNIFICANT CHANGE UP (ref 0–1)
BILIRUB SERPL-MCNC: 0.7 MG/DL — SIGNIFICANT CHANGE UP (ref 0.2–1.2)
BLD GP AB SCN SERPL QL: SIGNIFICANT CHANGE UP
BUN SERPL-MCNC: 11 MG/DL — SIGNIFICANT CHANGE UP (ref 10–20)
BUN SERPL-MCNC: 14 MG/DL — SIGNIFICANT CHANGE UP (ref 10–20)
CALCIUM SERPL-MCNC: 9.1 MG/DL — SIGNIFICANT CHANGE UP (ref 8.5–10.1)
CALCIUM SERPL-MCNC: 9.6 MG/DL — SIGNIFICANT CHANGE UP (ref 8.5–10.1)
CHLORIDE SERPL-SCNC: 101 MMOL/L — SIGNIFICANT CHANGE UP (ref 98–110)
CHLORIDE SERPL-SCNC: 104 MMOL/L — SIGNIFICANT CHANGE UP (ref 98–110)
CO2 SERPL-SCNC: 26 MMOL/L — SIGNIFICANT CHANGE UP (ref 17–32)
CO2 SERPL-SCNC: 27 MMOL/L — SIGNIFICANT CHANGE UP (ref 17–32)
CREAT SERPL-MCNC: 1 MG/DL — SIGNIFICANT CHANGE UP (ref 0.7–1.5)
CREAT SERPL-MCNC: 1 MG/DL — SIGNIFICANT CHANGE UP (ref 0.7–1.5)
EOSINOPHIL # BLD AUTO: 0.21 K/UL — SIGNIFICANT CHANGE UP (ref 0–0.7)
EOSINOPHIL NFR BLD AUTO: 2.6 % — SIGNIFICANT CHANGE UP (ref 0–8)
GLUCOSE SERPL-MCNC: 104 MG/DL — HIGH (ref 70–99)
GLUCOSE SERPL-MCNC: 96 MG/DL — SIGNIFICANT CHANGE UP (ref 70–99)
HCT VFR BLD CALC: 36.1 % — LOW (ref 42–52)
HCT VFR BLD CALC: 39.8 % — LOW (ref 42–52)
HGB BLD-MCNC: 12.5 G/DL — LOW (ref 14–18)
HGB BLD-MCNC: 13.4 G/DL — LOW (ref 14–18)
IMM GRANULOCYTES NFR BLD AUTO: 0.4 % — HIGH (ref 0.1–0.3)
INR BLD: 1.08 RATIO — SIGNIFICANT CHANGE UP (ref 0.65–1.3)
LYMPHOCYTES # BLD AUTO: 1.9 K/UL — SIGNIFICANT CHANGE UP (ref 1.2–3.4)
LYMPHOCYTES # BLD AUTO: 23.9 % — SIGNIFICANT CHANGE UP (ref 20.5–51.1)
MAGNESIUM SERPL-MCNC: 1.9 MG/DL — SIGNIFICANT CHANGE UP (ref 1.8–2.4)
MAGNESIUM SERPL-MCNC: 2 MG/DL — SIGNIFICANT CHANGE UP (ref 1.8–2.4)
MCHC RBC-ENTMCNC: 30.2 PG — SIGNIFICANT CHANGE UP (ref 27–31)
MCHC RBC-ENTMCNC: 31.1 PG — HIGH (ref 27–31)
MCHC RBC-ENTMCNC: 33.7 G/DL — SIGNIFICANT CHANGE UP (ref 32–37)
MCHC RBC-ENTMCNC: 34.6 G/DL — SIGNIFICANT CHANGE UP (ref 32–37)
MCV RBC AUTO: 89.6 FL — SIGNIFICANT CHANGE UP (ref 80–94)
MCV RBC AUTO: 89.8 FL — SIGNIFICANT CHANGE UP (ref 80–94)
MONOCYTES # BLD AUTO: 0.74 K/UL — HIGH (ref 0.1–0.6)
MONOCYTES NFR BLD AUTO: 9.3 % — SIGNIFICANT CHANGE UP (ref 1.7–9.3)
NEUTROPHILS # BLD AUTO: 5.04 K/UL — SIGNIFICANT CHANGE UP (ref 1.4–6.5)
NEUTROPHILS NFR BLD AUTO: 63.3 % — SIGNIFICANT CHANGE UP (ref 42.2–75.2)
NRBC # BLD: 0 /100 WBCS — SIGNIFICANT CHANGE UP (ref 0–0)
NRBC # BLD: 0 /100 WBCS — SIGNIFICANT CHANGE UP (ref 0–0)
PLATELET # BLD AUTO: 235 K/UL — SIGNIFICANT CHANGE UP (ref 130–400)
PLATELET # BLD AUTO: 270 K/UL — SIGNIFICANT CHANGE UP (ref 130–400)
POTASSIUM SERPL-MCNC: 4.3 MMOL/L — SIGNIFICANT CHANGE UP (ref 3.5–5)
POTASSIUM SERPL-MCNC: 4.6 MMOL/L — SIGNIFICANT CHANGE UP (ref 3.5–5)
POTASSIUM SERPL-SCNC: 4.3 MMOL/L — SIGNIFICANT CHANGE UP (ref 3.5–5)
POTASSIUM SERPL-SCNC: 4.6 MMOL/L — SIGNIFICANT CHANGE UP (ref 3.5–5)
PROT SERPL-MCNC: 6.8 G/DL — SIGNIFICANT CHANGE UP (ref 6–8)
PROTHROM AB SERPL-ACNC: 12.4 SEC — SIGNIFICANT CHANGE UP (ref 9.95–12.87)
RBC # BLD: 4.02 M/UL — LOW (ref 4.7–6.1)
RBC # BLD: 4.44 M/UL — LOW (ref 4.7–6.1)
RBC # FLD: 11 % — LOW (ref 11.5–14.5)
RBC # FLD: 11.1 % — LOW (ref 11.5–14.5)
SARS-COV-2 RNA SPEC QL NAA+PROBE: SIGNIFICANT CHANGE UP
SODIUM SERPL-SCNC: 135 MMOL/L — SIGNIFICANT CHANGE UP (ref 135–146)
SODIUM SERPL-SCNC: 140 MMOL/L — SIGNIFICANT CHANGE UP (ref 135–146)
TSH SERPL-MCNC: 0.76 UIU/ML — SIGNIFICANT CHANGE UP (ref 0.27–4.2)
WBC # BLD: 7.96 K/UL — SIGNIFICANT CHANGE UP (ref 4.8–10.8)
WBC # BLD: 8.23 K/UL — SIGNIFICANT CHANGE UP (ref 4.8–10.8)
WBC # FLD AUTO: 7.96 K/UL — SIGNIFICANT CHANGE UP (ref 4.8–10.8)
WBC # FLD AUTO: 8.23 K/UL — SIGNIFICANT CHANGE UP (ref 4.8–10.8)

## 2021-08-03 PROCEDURE — 99233 SBSQ HOSP IP/OBS HIGH 50: CPT

## 2021-08-03 PROCEDURE — 99232 SBSQ HOSP IP/OBS MODERATE 35: CPT

## 2021-08-03 RX ADMIN — PANTOPRAZOLE SODIUM 40 MILLIGRAM(S): 20 TABLET, DELAYED RELEASE ORAL at 06:02

## 2021-08-03 RX ADMIN — LISINOPRIL 10 MILLIGRAM(S): 2.5 TABLET ORAL at 21:16

## 2021-08-03 RX ADMIN — ATORVASTATIN CALCIUM 10 MILLIGRAM(S): 80 TABLET, FILM COATED ORAL at 21:16

## 2021-08-03 RX ADMIN — ENOXAPARIN SODIUM 40 MILLIGRAM(S): 100 INJECTION SUBCUTANEOUS at 13:26

## 2021-08-03 RX ADMIN — Medication 81 MILLIGRAM(S): at 12:01

## 2021-08-03 NOTE — PROGRESS NOTE ADULT - ASSESSMENT
60 year old male with hx of recently diagnosed HTN, HLD, chronic cough since being involved in 9/11, GERD presents acutely with an episode of syncope with generalized weakness, decreased PO intake, black stools, and nausea of one month's duration.     # Syncope - Orthostatic hypotension d/t Diuresis and decreased PO intake  - CT head negative, had a syncopal episode in May as well CT head and MRI were negative  - was told that he was sweaty and BP was 77/40 done by EMS at his house right after his fall  - admits to decreased PO intake because of reduced appetite and early satiety of over a month duration / patient has also been having black formed stools  - because of recurrent syncope and sudden collapse, some suspicion for cardiac cause, patient is juaquin in the 50s at rest  - s/p Loop recorder by EP on 08/02  - BARBARA (08/02): LVEF 50-65%; No cardioembolic source of CVA / TIA is identified  - orthostatics VS positive - Repeat orthostatics negative after discontinuing HCTZ and rescheduling lisinopril to PM  - GI cs appreciated plan for EGD ( inpatient vs outpatient) after syncope work up completed  - Lyme titer - negative; Follow up TSH    # Normocytic anemia with nausea / early satiety / black formed stools  - Symptoms have been for about a month he states, daughter states it may have been longer, has hx of GERD  - had EGD in 2018, does not remember what findings were / consulted GI due to alarm symptoms, has mild anemia  - f/u retic and iron studies for anemia  - GILBERTO negative in the ED  - GI cs appreciated- plan for EGD once cleared by Cardio  - Called Dr. Nicolas's service for cardio clearance - awaiting cardio follow up    # HTN  - reschedule lisinopril to PM. d/c HCTZ as per EP recs  - Pt Orthostats improved, continue current meds    # HLD  - c/w atorvastatin 10 qd    # Diet: DASH/TLC  # Activity: IAT  # DVT PPX: Lovenox  # GI PPX: PPI  # Code status: Full code 60 year old male with hx of recently diagnosed HTN, HLD, chronic cough since being involved in 9/11, GERD presents acutely with an episode of syncope with generalized weakness, decreased PO intake, black stools, and nausea of one month's duration.     # Syncope - Orthostatic hypotension d/t Diuresis and decreased PO intake  - CT head negative, had a syncopal episode in May as well CT head and MRI were negative  - was told that he was sweaty and BP was 77/40 done by EMS at his house right after his fall  - admits to decreased PO intake because of reduced appetite and early satiety of over a month duration / patient has also been having black formed stools  - because of recurrent syncope and sudden collapse, some suspicion for cardiac cause, patient is juaquin in the 50s at rest  - s/p Loop recorder by EP on 08/02  - BARBARA (08/02): LVEF 50-65%; No cardioembolic source of CVA / TIA is identified  - orthostatics VS positive - Repeat orthostatics negative after discontinuing HCTZ and rescheduling lisinopril to PM  - Lyme titer - negative; Follow up TSH  - GI cs appreciated plan for EGD ( inpatient vs outpatient) after syncope work up completed - Possibly tomorrow    # Normocytic anemia with nausea / early satiety / black formed stools  - Symptoms have been for about a month he states, daughter states it may have been longer, has hx of GERD  - had EGD in 2018, does not remember what findings were / consulted GI due to alarm symptoms, has mild anemia  - f/u retic and iron studies for anemia  - GILBERTO negative in the ED  - GI cs appreciated- plan for EGD once cleared by Cardio  - Called Dr. Nicolas's service for cardio clearance - awaiting cardio follow up    # HTN  - reschedule lisinopril to PM. d/c HCTZ as per EP recs  - Pt Orthostats improved, continue current meds    # HLD  - c/w atorvastatin 10 qd    # Diet: DASH/TLC  # Activity: IAT  # DVT PPX: Lovenox  # GI PPX: PPI  # Code status: Full code 60 year old male with hx of recently diagnosed HTN, HLD, chronic cough since being involved in 9/11, GERD presents acutely with an episode of syncope with generalized weakness, decreased PO intake, black stools, and nausea of one month's duration.     # Syncope - Orthostatic hypotension d/t Diuresis and decreased PO intake  - CT head negative, had a syncopal episode in May as well CT head and MRI were negative  - was told that he was sweaty and BP was 77/40 done by EMS at his house right after his fall  - admits to decreased PO intake because of reduced appetite and early satiety of over a month duration / patient has also been having black formed stools  - because of recurrent syncope and sudden collapse, some suspicion for cardiac cause, patient is juaquin in the 50s at rest  - s/p Loop recorder by EP on 08/02  - BARBARA (08/02): LVEF 50-65%; No cardioembolic source of CVA / TIA is identified  - orthostatics VS positive - Repeat orthostatics negative after discontinuing HCTZ and rescheduling lisinopril to PM  - Lyme titer - negative; Follow up TSH  - GI cs appreciated plan for EGD tomm, requesting Cardio clearance from Dr. Gregg, catracho Gregg     # Normocytic anemia with nausea / early satiety / black formed stools  - Symptoms have been for about a month he states, daughter states it may have been longer, has hx of GERD  - had EGD in 2018, does not remember what findings were / consulted GI due to alarm symptoms, has mild anemia  - f/u retic and iron studies for anemia  - GILBERTO negative in the ED  - GI cs appreciated- plan for EGD once cleared by Cardio  - Called Dr. Nicolas's service for cardio clearance - awaiting cardio follow up    # HTN  - reschedule lisinopril to PM. d/c HCTZ as per EP recs  - Pt Orthostats improved, continue current meds    # HLD  - c/w atorvastatin 10 qd    # Diet: DASH/TLC  # Activity: IAT  # DVT PPX: Lovenox  # GI PPX: PPI  # Code status: Full code

## 2021-08-03 NOTE — PROGRESS NOTE ADULT - SUBJECTIVE AND OBJECTIVE BOX
Patient is a 60y old  Male who presents with a chief complaint of syncope.        Admitted on: 07-31-21    We are following the patient for nausea and early satiety      Interval History: Patient had normal BM, no abdominal pain, no vomiting. patient still has significant nausea with early satiety with solid food.         PAST MEDICAL & SURGICAL HISTORY:  HTN (hypertension)    Hyperlipidemia    No significant past surgical history        MEDICATIONS  (STANDING):  aspirin  chewable 81 milliGRAM(s) Oral daily  atorvastatin 10 milliGRAM(s) Oral at bedtime  enoxaparin Injectable 40 milliGRAM(s) SubCutaneous daily  lisinopril 10 milliGRAM(s) Oral at bedtime  pantoprazole    Tablet 40 milliGRAM(s) Oral before breakfast    MEDICATIONS  (PRN):  acetaminophen   Tablet .. 650 milliGRAM(s) Oral every 6 hours PRN Mild Pain (1 - 3)      Allergies  No Known Allergies      Review of Systems:   Cardiovascular:  No Chest Pain, No Palpitations  Respiratory:  No Cough, No Dyspnea  Gastrointestinal:  As described in HPI    Physical Examination:  T(C): 36.8 (08-03-21 @ 06:03), Max: 37.1 (08-02-21 @ 20:03)  HR: 68 (08-03-21 @ 06:03) (62 - 69)  BP: 109/67 (08-03-21 @ 06:03) (109/67 - 155/77)  RR: 18 (08-03-21 @ 06:03) (18 - 19)  SpO2: 96% (08-03-21 @ 06:03) (96% - 96%)      08-02-21 @ 07:01  -  08-03-21 @ 07:00  --------------------------------------------------------  IN: 375 mL / OUT: 0 mL / NET: 375 mL      Constitutional: No acute distress.  Respiratory:  No signs of respiratory distress. Lung sounds are clear bilaterally.  Cardiovascular:  S1 S2, Regular rate and rhythm.  Abdominal: Abdomen is soft, symmetric, and non-tender without distention.  Skin: No rashes, No Jaundice.        Data: (reviewed by attending)                        13.4   7.96  )-----------( 270      ( 03 Aug 2021 07:53 )             39.8     Hgb trend:  13.4  08-03-21 @ 07:53  12.9  08-02-21 @ 04:30  13.2  08-01-21 @ 07:23        08-03    140  |  104  |  11  ----------------------------<  96  4.6   |  26  |  1.0    Ca    9.6      03 Aug 2021 07:53  Mg     2.0     08-03    TPro  6.8  /  Alb  4.4  /  TBili  0.7  /  DBili  x   /  AST  17  /  ALT  16  /  AlkPhos  83  08-03    Liver panel trend:  TBili 0.7   /   AST 17   /   ALT 16   /   AlkP 83   /   Tptn 6.8   /   Alb 4.4    /   DBili --      08-03  TBili 0.7   /   AST 20   /   ALT 16   /   AlkP 79   /   Tptn 6.4   /   Alb 4.1    /   DBili --      08-02  TBili 0.7   /   AST 21   /   ALT 15   /   AlkP 83   /   Tptn 6.7   /   Alb 4.3    /   DBili --      08-01  TBili 0.4   /   AST 18   /   ALT 14   /   AlkP 90   /   Tptn 6.9   /   Alb 4.5    /   DBili --      07-31             Radiology: (reviewed by attending)

## 2021-08-03 NOTE — PROGRESS NOTE ADULT - ASSESSMENT
60 year old male with hx of recently diagnosed HTN, HLD, chronic cough since being involved in 9/11, GERD presents acutely with an episode of syncope with generalized weakness, decreased PO intake, black stools, and nausea of one month's duration      # Early satiety, nausea, weight loss: R/O gastric malignancy vs gastroparesis, vs PUD  - EGD/colonoscopy 3 years ago--> unremarkable as per patient  -patient currently admitted for syncope work up.  -Hg and hemodynamically stable  -BARBARA performed EF normal, loop recorder placed.     Rec:  - start low fat, small frequent meals.  - Cardiology eval for syncopal episode--> if cleared can perform EGD tomorrow.  - Please keep NPO after midnight.  -If EGd is negative will consider GE study  -I spoke to patient in detail about risks and benefits of EGD in current situation and patient understands and wants EGD as inpatient 60 year old male with hx of recently diagnosed HTN, HLD, chronic cough since being involved in 9/11, GERD presents acutely with an episode of syncope with generalized weakness, decreased PO intake, black stools, and nausea of one month's duration      # Early satiety, nausea, weight loss: R/O gastric malignancy vs gastroparesis, vs PUD  - EGD/colonoscopy 3 years ago--> unremarkable as per patient  -patient currently admitted for syncope work up.  -Hg and hemodynamically stable  -BARBARA performed EF normal, loop recorder placed.     Rec:  - start low fat, small frequent meals.  - Cardiology eval for syncopal episode--> if cleared can perform EGD tomorrow.  - Please keep NPO after midnight.  -If EGD is negative will consider GE study  -I spoke to patient in detail about risks and benefits of EGD in current situation and patient understands and wants EGD as inpatient

## 2021-08-03 NOTE — CHART NOTE - NSCHARTNOTEFT_GEN_A_CORE
s/p loop yesterday  remove dressing tomorrow  can take a shower tomorrow  f/u in EP office with GUILHERME Suárez 8/30/21 12pm  Dr Reyes office  [address] [place] [address]  [phone]

## 2021-08-03 NOTE — PROGRESS NOTE ADULT - ASSESSMENT
well controlled HTN  recent volume depletion and orthostatic hypotension resolved  Pre op risk assessment for the EGD possibly colonoscopy    Low risk patint and procedure  RCRI score 0, for a procedure under general anesthesia  by accepting a low risk, he is in stable condition to have the procedure done,   no change in the medicine

## 2021-08-03 NOTE — PROGRESS NOTE ADULT - ATTENDING COMMENTS
#Progress Note Handoff  Pending (specify): follow up syncopal work up, cardio  Family discussion: house staff updated pt family  Disposition: f9
pt who presents with syncope, also reports early satiety and nausea for one month, will plan for EGD tomorrow pending cardiac evaluation
Plan for ILR implant in am
#Progress Note Handoff  Pending (specify): EGD tomm, cardiology clearance   Family discussion: house staff updated pt family  Disposition: Medicine

## 2021-08-03 NOTE — PROGRESS NOTE ADULT - SUBJECTIVE AND OBJECTIVE BOX
** This is an incomplete note - pending AM rounds**   JEFF GOODWIN 60y Male  MRN#: [id number]   CODE STATUS:    Hospital Day: 3d    Pt is currently admitted with the primary diagnosis of       SUBJECTIVE  Hospital Course  HPI:  60 year old male with hx of recently diagnosed HTN, HLD, chronic cough since being involved in , GERD presents acutely with an episode of syncope with generalized weakness, decreased PO intake, black stools, and nausea of one month's duration.  Patient states he went to get a glass of water last night and the last thing he remembers is being woken up by his daughter on the ground.  His daughter heard him fall and woke him up immediately.  He thinks he fell on his right side as that's where he's feeling pain.  Of note the EMS told him his blood pressure was 77/40 when he came, his family told him he was sweating.  He was recently diagnosed with hypertension and started on lisinopril after having lower extremity edema with amlodipine.    Patient has also been endorsing nausea for at least a month's duration along with early satiety.  He had an EGD (for chronic cough) 3 years ago and was diagnosed with GERD, he had a colonoscopy at the same time and was told it was clear with benign polyps.  He has been endorsing formed black stools for at least a month's duration as well and has not followed up with GI since, because of his decreased appetite he lost about 7 pounds in that time.    He currently denies chest pain, denies abdominal pain, denies diarrhea.    Triage vitals: BP 99/55  HR 67  RR 20  Temp 97.8  SpO2 98% on room air (2021 12:57)      Overnight events/Subjective complaints        Present Today:   -[place]:  No [  ], Yes [   ]; Indication:     -Type of IV Access:   ----> CVC/Piccline:  No [  ], Yes [   ]; Indication:   ----> Midline: No [  ], Yes [   ];  Indication:                                             ----------------------------------------------------------  OBJECTIVE  PAST MEDICAL & SURGICAL HISTORY  HTN (hypertension)    Hyperlipidemia    No significant past surgical history                                              -----------------------------------------------------------  ALLERGIES:  No Known Allergies                                            ------------------------------------------------------------    HOME MEDICATIONS  Home Medications:  aspirin 81 mg oral tablet, chewable: 1 tab(s) orally once a day (2021 12:56)  atorvastatin 10 mg oral tablet: 1 tab(s) orally once a day (2021 12:56)  lisinopril 10 mg oral tablet: 1 tab(s) orally once a day (2021 12:55)  lisinopril 2.5 mg oral tablet: 1 tab(s) orally once a day (2021 12:55)  omeprazole 40 mg oral delayed release capsule: 1 cap(s) orally once a day (2021 12:56)                           MEDICATIONS:  STANDING MEDICATIONS  aspirin  chewable 81 milliGRAM(s) Oral daily  atorvastatin 10 milliGRAM(s) Oral at bedtime  enoxaparin Injectable 40 milliGRAM(s) SubCutaneous daily  lisinopril 10 milliGRAM(s) Oral at bedtime  pantoprazole    Tablet 40 milliGRAM(s) Oral before breakfast    PRN MEDICATIONS  acetaminophen   Tablet .. 650 milliGRAM(s) Oral every 6 hours PRN                                            ------------------------------------------------------------  VITAL SIGNS: Last 24 Hours  T(C): 36.8 (03 Aug 2021 06:03), Max: 37.1 (02 Aug 2021 20:03)  T(F): 98.2 (03 Aug 2021 06:03), Max: 98.7 (02 Aug 2021 20:03)  HR: 68 (03 Aug 2021 06:03) (54 - 69)  BP: 109/67 (03 Aug 2021 06:03) (109/67 - 155/77)  BP(mean): --  RR: 18 (03 Aug 2021 06:03) (18 - 19)  SpO2: 96% (03 Aug 2021 06:03) (96% - 100%)      21 @ 07:01  -  21 @ 07:00  --------------------------------------------------------  IN: 1140 mL / OUT: 0 mL / NET: 1140 mL    21 @ 07:01  -  21 @ 06:53  --------------------------------------------------------  IN: 375 mL / OUT: 0 mL / NET: 375 mL        Daily Height in cm: 190.5 (02 Aug 2021 10:46), Height in cm: 190.5 (2021 09:32), Height in cm: 190.5 (2021 01:00)    Daily Weight in k.6 (02 Aug 2021 05:00), Weight in k.1 (01 Aug 2021 05:11)                                        --------------------------------------------------------------  LABS:                        12.9   6.89  )-----------( 256      ( 02 Aug 2021 04:30 )             37.8                         13.2   7.60  )-----------( 260      ( 01 Aug 2021 07:23 )             38.9                         12.9   10.23 )-----------( 258      ( 2021 03:29 )             38.7     08 @ 04:30    139  |  107  |  11  ----------------------------<  100<H>  5.0   |  25  |  0.9   @ 07:23    138  |  104  |  13  ----------------------------<  101<H>  4.6   |  26  |  0.9   03:29    137  |  101  |  19  ----------------------------<  113<H>  4.5   |  29  |  1.1    Ca    9.5       @ 04:30  Ca    9.5       @ 07:23  Ca    9.3       @ 03:29  Mg     2.0       Mg     2.1       Mg     2.1         TPro  6.4  /  Alb  4.1  /  TBili  0.7  /  DBili  x   /  AST  20  /  ALT  16  /  AlkPhos  79    TPro  6.7  /  Alb  4.3  /  TBili  0.7  /  DBili  x   /  AST  21  /  ALT  15  /  AlkPhos  83    TPro  6.9  /  Alb  4.5  /  TBili  0.4  /  DBili  x   /  AST  18  /  ALT  14  /  AlkPhos  90      PT/INR - ( 2021 03:29 )   PT: 11.70 sec;   INR: 1.02 ratio         PTT - ( 2021 03:29 )  PTT:27.2 sec      Troponin T, Serum: <0.01 ( @ 07:23)          CARDIAC MARKERS ( 21 @ 07:23 )  x     / <0.01 ng/mL / x     / x     / x      CARDIAC MARKERS ( 21 @ 03:29 )  x     / <0.01 ng/mL / x     / x     / x                                                  -------------------------------------------------------------  RADIOLOGY:                                            --------------------------------------------------------------    PHYSICAL EXAM:  General:   HEENT:  LUNGS:  HEART:  ABDOMEN:  EXT:  NEURO:  SKIN:                                           -------------------------------------------------------------- JEFF GOODWIN 60y Male  MRN#: [id number]   CODE STATUS:    Hospital Day: 3d    Pt is currently admitted with the primary diagnosis of syncope      SUBJECTIVE  Hospital Course  HPI:  60 year old male with hx of recently diagnosed HTN, HLD, chronic cough since being involved in , GERD presents acutely with an episode of syncope with generalized weakness, decreased PO intake, black stools, and nausea of one month's duration.  Patient states he went to get a glass of water last night and the last thing he remembers is being woken up by his daughter on the ground.  His daughter heard him fall and woke him up immediately.  He thinks he fell on his right side as that's where he's feeling pain.  Of note the EMS told him his blood pressure was 77/40 when he came, his family told him he was sweating.  He was recently diagnosed with hypertension and started on lisinopril after having lower extremity edema with amlodipine.    Patient has also been endorsing nausea for at least a month's duration along with early satiety.  He had an EGD (for chronic cough) 3 years ago and was diagnosed with GERD, he had a colonoscopy at the same time and was told it was clear with benign polyps.  He has been endorsing formed black stools for at least a month's duration as well and has not followed up with GI since, because of his decreased appetite he lost about 7 pounds in that time.    He currently denies chest pain, denies abdominal pain, denies diarrhea.    Triage vitals: BP 99/55  HR 67  RR 20  Temp 97.8  SpO2 98% on room air (2021 12:57)      Overnight events/Subjective complaints  Patient was seen at the bedside this morning. He is lying comfortably on the bed. There were no acute events overnight.   He denies any chest pain, shortness of breath, cough, fever, chills, abdominal pain, nausea, vomiting, diarrhea, dizziness or headache.                                                 ----------------------------------------------------------  OBJECTIVE  PAST MEDICAL & SURGICAL HISTORY  HTN (hypertension)    Hyperlipidemia    No significant past surgical history                                              -----------------------------------------------------------  ALLERGIES:  No Known Allergies                                            ------------------------------------------------------------    HOME MEDICATIONS  Home Medications:  aspirin 81 mg oral tablet, chewable: 1 tab(s) orally once a day (2021 12:56)  atorvastatin 10 mg oral tablet: 1 tab(s) orally once a day (2021 12:56)  lisinopril 10 mg oral tablet: 1 tab(s) orally once a day (2021 12:55)  lisinopril 2.5 mg oral tablet: 1 tab(s) orally once a day (2021 12:55)  omeprazole 40 mg oral delayed release capsule: 1 cap(s) orally once a day (2021 12:56)                           MEDICATIONS:  STANDING MEDICATIONS  aspirin  chewable 81 milliGRAM(s) Oral daily  atorvastatin 10 milliGRAM(s) Oral at bedtime  enoxaparin Injectable 40 milliGRAM(s) SubCutaneous daily  lisinopril 10 milliGRAM(s) Oral at bedtime  pantoprazole    Tablet 40 milliGRAM(s) Oral before breakfast    PRN MEDICATIONS  acetaminophen   Tablet .. 650 milliGRAM(s) Oral every 6 hours PRN                                            ------------------------------------------------------------  VITAL SIGNS: Last 24 Hours  T(C): 36.8 (03 Aug 2021 06:03), Max: 37.1 (02 Aug 2021 20:03)  T(F): 98.2 (03 Aug 2021 06:03), Max: 98.7 (02 Aug 2021 20:03)  HR: 68 (03 Aug 2021 06:03) (54 - 69)  BP: 109/67 (03 Aug 2021 06:03) (109/67 - 155/77)  BP(mean): --  RR: 18 (03 Aug 2021 06:03) (18 - 19)  SpO2: 96% (03 Aug 2021 06:03) (96% - 100%)      21 @ 07:01  -  21 @ 07:00  --------------------------------------------------------  IN: 1140 mL / OUT: 0 mL / NET: 1140 mL    21 @ 07:01  -  21 @ 06:53  --------------------------------------------------------  IN: 375 mL / OUT: 0 mL / NET: 375 mL        Daily Height in cm: 190.5 (02 Aug 2021 10:46), Height in cm: 190.5 (2021 09:32), Height in cm: 190.5 (2021 01:00)    Daily Weight in k.6 (02 Aug 2021 05:00), Weight in k.1 (01 Aug 2021 05:11)                                        --------------------------------------------------------------  LABS:                        12.9   6.89  )-----------( 256      ( 02 Aug 2021 04:30 )             37.8                         13.2   7.60  )-----------( 260      ( 01 Aug 2021 07:23 )             38.9                         12.9   10.23 )-----------( 258      ( 2021 03:29 )             38.7     08 @ 04:30    139  |  107  |  11  ----------------------------<  100<H>  5.0   |  25  |  0.9   @ 07:23    138  |  104  |  13  ----------------------------<  101<H>  4.6   |  26  |  0.9   03:29    137  |  101  |  19  ----------------------------<  113<H>  4.5   |  29  |  1.1    Ca    9.5       @ 04:30  Ca    9.5       @ 07:23  Ca    9.3       @ 03:29  Mg     2.0       Mg     2.1       Mg     2.1         TPro  6.4  /  Alb  4.1  /  TBili  0.7  /  DBili  x   /  AST  20  /  ALT  16  /  AlkPhos  79    TPro  6.7  /  Alb  4.3  /  TBili  0.7  /  DBili  x   /  AST  21  /  ALT  15  /  AlkPhos  83    TPro  6.9  /  Alb  4.5  /  TBili  0.4  /  DBili  x   /  AST  18  /  ALT  14  /  AlkPhos  90      PT/INR - ( 2021 03:29 )   PT: 11.70 sec;   INR: 1.02 ratio         PTT - ( 2021 03:29 )  PTT:27.2 sec      Troponin T, Serum: <0.01 ( @ 07:23)          CARDIAC MARKERS ( 21 @ 07:23 )  x     / <0.01 ng/mL / x     / x     / x      CARDIAC MARKERS ( 21 @ 03:29 )  x     / <0.01 ng/mL / x     / x     / x                                                  -------------------------------------------------------------  RADIOLOGY:  CT Head No Cont (21 @ 07:37)  IMPRESSION:  No acute intracranial pathology. No evidence of midline shift, mass effect or intracranial hemorrhage.                                          --------------------------------------------------------------    PHYSICAL EXAM:  General: No acute distress; Pallor (-), Icterus (-), Cyanosis (-), Clubbing (-)  HEENT: Normocephalic, atraumatic, PERRLA, EOMI  PULM: Bilaterally equal and clear breath sounds, wheeze (-), rubs (-), crackles (-)  CVS: Normal S1 and S2, murmurs (-), rubs (-), gallops (-)   GI: Soft, nondistended, nontender, BS +  MSK: Edema (-), no muscle, bone or joint tenderness noted  SKIN: Warm and well perfused, no rashes noted  NEURO:  Alert and Oriented x 3; No gross focal neurological deficit noted                                             -------------------------------------------------------------- JEFF GOODWIN 60y Male  MRN#: [id number]   CODE STATUS:    Hospital Day: 3d    Pt is currently admitted with the primary diagnosis of syncope      SUBJECTIVE  Hospital Course  HPI:  60 year old male with hx of recently diagnosed HTN, HLD, chronic cough since being involved in , GERD presents acutely with an episode of syncope with generalized weakness, decreased PO intake, black stools, and nausea of one month's duration.  Patient states he went to get a glass of water last night and the last thing he remembers is being woken up by his daughter on the ground.  His daughter heard him fall and woke him up immediately.  He thinks he fell on his right side as that's where he's feeling pain.  Of note the EMS told him his blood pressure was 77/40 when he came, his family told him he was sweating.  He was recently diagnosed with hypertension and started on lisinopril after having lower extremity edema with amlodipine.    Patient has also been endorsing nausea for at least a month's duration along with early satiety.  He had an EGD (for chronic cough) 3 years ago and was diagnosed with GERD, he had a colonoscopy at the same time and was told it was clear with benign polyps.  He has been endorsing formed black stools for at least a month's duration as well and has not followed up with GI since, because of his decreased appetite he lost about 7 pounds in that time.    He currently denies chest pain, denies abdominal pain, denies diarrhea.    Triage vitals: BP 99/55  HR 67  RR 20  Temp 97.8  SpO2 98% on room air (2021 12:57)      Overnight events/Subjective complaints  Patient was seen at the bedside this morning. He is lying comfortably on the bed. There were no acute events overnight.   He denies any chest pain, shortness of breath, cough, fever, chills, abdominal pain, nausea, vomiting, diarrhea, dizziness or headache.     Attending Note: Pt seen and examined at bedside. No cp or sob                                                ----------------------------------------------------------  OBJECTIVE  PAST MEDICAL & SURGICAL HISTORY  HTN (hypertension)    Hyperlipidemia    No significant past surgical history                                              -----------------------------------------------------------  ALLERGIES:  No Known Allergies                                            ------------------------------------------------------------    HOME MEDICATIONS  Home Medications:  aspirin 81 mg oral tablet, chewable: 1 tab(s) orally once a day (2021 12:56)  atorvastatin 10 mg oral tablet: 1 tab(s) orally once a day (2021 12:56)  lisinopril 10 mg oral tablet: 1 tab(s) orally once a day (2021 12:55)  lisinopril 2.5 mg oral tablet: 1 tab(s) orally once a day (2021 12:55)  omeprazole 40 mg oral delayed release capsule: 1 cap(s) orally once a day (2021 12:56)                           MEDICATIONS:  STANDING MEDICATIONS  aspirin  chewable 81 milliGRAM(s) Oral daily  atorvastatin 10 milliGRAM(s) Oral at bedtime  enoxaparin Injectable 40 milliGRAM(s) SubCutaneous daily  lisinopril 10 milliGRAM(s) Oral at bedtime  pantoprazole    Tablet 40 milliGRAM(s) Oral before breakfast    PRN MEDICATIONS  acetaminophen   Tablet .. 650 milliGRAM(s) Oral every 6 hours PRN                                            ------------------------------------------------------------  VITAL SIGNS: Last 24 Hours  T(C): 36.8 (03 Aug 2021 06:03), Max: 37.1 (02 Aug 2021 20:03)  T(F): 98.2 (03 Aug 2021 06:03), Max: 98.7 (02 Aug 2021 20:03)  HR: 68 (03 Aug 2021 06:03) (54 - 69)  BP: 109/67 (03 Aug 2021 06:03) (109/67 - 155/77)  BP(mean): --  RR: 18 (03 Aug 2021 06:03) (18 - 19)  SpO2: 96% (03 Aug 2021 06:03) (96% - 100%)      21 @ 07:01  -  21 @ 07:00  --------------------------------------------------------  IN: 1140 mL / OUT: 0 mL / NET: 1140 mL    21 @ 07:01  -  21 @ 06:53  --------------------------------------------------------  IN: 375 mL / OUT: 0 mL / NET: 375 mL        Daily Height in cm: 190.5 (02 Aug 2021 10:46), Height in cm: 190.5 (2021 09:32), Height in cm: 190.5 (2021 01:00)    Daily Weight in k.6 (02 Aug 2021 05:00), Weight in k.1 (01 Aug 2021 05:11)                                        --------------------------------------------------------------  LABS:                        12.9   6.89  )-----------( 256      ( 02 Aug 2021 04:30 )             37.8                         13.2   7.60  )-----------( 260      ( 01 Aug 2021 07:23 )             38.9                         12.9   10.23 )-----------( 258      ( 2021 03:29 )             38.7     08 @ 04:30    139  |  107  |  11  ----------------------------<  100<H>  5.0   |  25  |  0.9   @ 07:23    138  |  104  |  13  ----------------------------<  101<H>  4.6   |  26  |  0.9   03:29    137  |  101  |  19  ----------------------------<  113<H>  4.5   |  29  |  1.1    Ca    9.5       @ 04:30  Ca    9.5       @ 07:23  Ca    9.3       @ 03:29  Mg     2.0       Mg     2.1       Mg     2.1         TPro  6.4  /  Alb  4.1  /  TBili  0.7  /  DBili  x   /  AST  20  /  ALT  16  /  AlkPhos  79    TPro  6.7  /  Alb  4.3  /  TBili  0.7  /  DBili  x   /  AST  21  /  ALT  15  /  AlkPhos  83    TPro  6.9  /  Alb  4.5  /  TBili  0.4  /  DBili  x   /  AST  18  /  ALT  14  /  AlkPhos  90      PT/INR - ( 2021 03:29 )   PT: 11.70 sec;   INR: 1.02 ratio         PTT - ( 2021 03:29 )  PTT:27.2 sec      Troponin T, Serum: <0.01 ( @ 07:23)          CARDIAC MARKERS ( 21 @ 07:23 )  x     / <0.01 ng/mL / x     / x     / x      CARDIAC MARKERS ( 21 @ 03:29 )  x     / <0.01 ng/mL / x     / x     / x                                                  -------------------------------------------------------------  RADIOLOGY:  CT Head No Cont (21 @ 07:37)  IMPRESSION:  No acute intracranial pathology. No evidence of midline shift, mass effect or intracranial hemorrhage.                                          --------------------------------------------------------------    PHYSICAL EXAM:  General: No acute distress; Pallor (-), Icterus (-), Cyanosis (-), Clubbing (-)  HEENT: Normocephalic, atraumatic, PERRLA, EOMI  PULM: Bilaterally equal and clear breath sounds, wheeze (-), rubs (-), crackles (-)  CVS: Normal S1 and S2, murmurs (-), rubs (-), gallops (-)   GI: Soft, nondistended, nontender, BS +  MSK: Edema (-), no muscle, bone or joint tenderness noted  SKIN: Warm and well perfused, no rashes noted  NEURO:  Alert and Oriented x 3; No gross focal neurological deficit noted                                             --------------------------------------------------------------

## 2021-08-03 NOTE — PROGRESS NOTE ADULT - SUBJECTIVE AND OBJECTIVE BOX
Subjective:  Patient scheduled to have EGD, cardiology evaluation as a pre op assessment requested. Patient has not had any cardiac issue or cardiac event, his syncope was attributed to volume depletion and weight loss.  Rapid weight loss, unknown etiology, lack of intake, for food and liquid, meanwhile taking diuretics, resulted in volume depletion and faint, even 24 hours after admission still had orthostatic hypotension, finally by well hydration was resolved.  Yesterday had a BARBARA, tolerated anesthesia and the procedure, no complication, no event, normal finding.    MEDICATIONS  (STANDING):  aspirin  chewable 81 milliGRAM(s) Oral daily  atorvastatin 10 milliGRAM(s) Oral at bedtime  enoxaparin Injectable 40 milliGRAM(s) SubCutaneous daily  lisinopril 10 milliGRAM(s) Oral at bedtime  pantoprazole    Tablet 40 milliGRAM(s) Oral before breakfast    MEDICATIONS  (PRN):  acetaminophen   Tablet .. 650 milliGRAM(s) Oral every 6 hours PRN Mild Pain (1 - 3)            Vital Signs Last 24 Hrs  T(C): 36.4 (03 Aug 2021 19:54), Max: 36.8 (03 Aug 2021 06:03)  T(F): 97.6 (03 Aug 2021 19:54), Max: 98.2 (03 Aug 2021 06:03)  HR: 77 (03 Aug 2021 19:54) (68 - 77)  BP: 123/77 (03 Aug 2021 19:54) (109/67 - 123/77)  BP(mean): --  RR: 18 (03 Aug 2021 19:54) (18 - 18)  SpO2: 96% (03 Aug 2021 06:03) (96% - 96%)             REVIEW OF SYSTEMS:  CONSTITUTIONAL: no fever, no chills, no diaphoresis, just weight loss, lack of appetite,   CARDIOLOGY: no chest pain, no SOB, no palpitation, no diaphoresis  RESPIRATORY: no dyspnea, no wheeze, no orthopnea, no PND   NEUROLOGICAL: no dizziness, headache, focal deficits to report.  GI: no abdominal pain, no dyspepsia, no nausea, no vomiting, no diarrhea.    HEENT: no congestion, no nasal bleeding  SKIN: no ecchymosis, no petechia             PHYSICAL EXAM:  · CONSTITUTIONAL: Looks stable,  . NECK: Supple, no JVD, no bruit   · RESPIRATORY: Normal air entry to lung base, no wheeze, no crackle, no wet rales  · CARDIOVASCULAR: Normal S1, A2, P2, no murmur, no click, regular rate  · EXTREMITIES: No cyanosis, no clubbing, no edema  · VASCULAR: Pulses are regular, equal, bilateral in upper and lower extremities  	  TELEMETRY: for few days monitoring was normal    ECG: < from: 12 Lead ECG (07.31.21 @ 04:35) >   Normal sinus rhythm  Normal ECG    < end of copied text >      BARBARA: LVEF 60%, RVEF normal, valves normal, slightly sclerotic aortic valve, no AS, trace MR, TR, no mass, thrombus or PFO    LABS:                        12.5   8.23  )-----------( 235      ( 03 Aug 2021 20:52 )             36.1     08-03    135  |  101  |  14  ----------------------------<  104<H>  4.3   |  27  |  1.0    Ca    9.1      03 Aug 2021 20:52  Mg     1.9     08-03    TPro  6.8  /  Alb  4.4  /  TBili  0.7  /  DBili  x   /  AST  17  /  ALT  16  /  AlkPhos  83  08-03        PT/INR - ( 03 Aug 2021 20:52 )   PT: 12.40 sec;   INR: 1.08 ratio             I&O's Summary    02 Aug 2021 07:01  -  03 Aug 2021 07:00  --------------------------------------------------------  IN: 375 mL / OUT: 0 mL / NET: 375 mL      BNP  RADIOLOGY & ADDITIONAL STUDIES:    IMPRESSION AND PLAN:

## 2021-08-04 ENCOUNTER — TRANSCRIPTION ENCOUNTER (OUTPATIENT)
Age: 61
End: 2021-08-04

## 2021-08-04 ENCOUNTER — RESULT REVIEW (OUTPATIENT)
Age: 61
End: 2021-08-04

## 2021-08-04 VITALS — HEIGHT: 75 IN | WEIGHT: 229.28 LBS

## 2021-08-04 LAB — MAGNESIUM SERPL-MCNC: 1.8 MG/DL — SIGNIFICANT CHANGE UP (ref 1.8–2.4)

## 2021-08-04 PROCEDURE — 88305 TISSUE EXAM BY PATHOLOGIST: CPT | Mod: 26

## 2021-08-04 PROCEDURE — 88312 SPECIAL STAINS GROUP 1: CPT | Mod: 26

## 2021-08-04 PROCEDURE — 43239 EGD BIOPSY SINGLE/MULTIPLE: CPT

## 2021-08-04 PROCEDURE — 99239 HOSP IP/OBS DSCHRG MGMT >30: CPT

## 2021-08-04 RX ORDER — LISINOPRIL 2.5 MG/1
1 TABLET ORAL
Qty: 0 | Refills: 0 | DISCHARGE

## 2021-08-04 RX ORDER — LISINOPRIL 2.5 MG/1
1 TABLET ORAL
Qty: 30 | Refills: 0
Start: 2021-08-04 | End: 2021-09-02

## 2021-08-04 RX ADMIN — ENOXAPARIN SODIUM 40 MILLIGRAM(S): 100 INJECTION SUBCUTANEOUS at 12:30

## 2021-08-04 RX ADMIN — Medication 81 MILLIGRAM(S): at 12:30

## 2021-08-04 RX ADMIN — Medication 650 MILLIGRAM(S): at 05:06

## 2021-08-04 NOTE — DISCHARGE NOTE PROVIDER - PROVIDER TOKENS
PROVIDER:[TOKEN:[[id number]],FOLLOWUP:[2 weeks]],PROVIDER:[TOKEN:[[id number]],FOLLOWUP:[2 weeks]],FREE:[LAST:[[facility]],PHONE:[phone]],FAX:[(   )    -],ADDRESS:[[address] [place] [address]],FOLLOWUP:[2 weeks]],PROVIDER:[TOKEN:[[id number]],FOLLOWUP:[2 weeks]]

## 2021-08-04 NOTE — DIETITIAN INITIAL EVALUATION ADULT. - NAME AND PHONE
Nutrition Intervention:meals and snacks,medical food supplements; Nutrition Monitoring:diet order,energy intake,body composition,NFPF,

## 2021-08-04 NOTE — DISCHARGE NOTE NURSING/CASE MANAGEMENT/SOCIAL WORK - NSDCFUADDAPPT_GEN_ALL_CORE_FT
f/u in EP office with GUILHERME Suárez 8/30/21 12pm  Dr Reyes office  [facility] [facility], [address]  [phone].

## 2021-08-04 NOTE — DIETITIAN INITIAL EVALUATION ADULT. - ADD RECOMMEND
1. Add ensure enlive BID. 2. Add GERD modifier to current diet order. 3. consider adding zofran for nausea and bowel regimen.

## 2021-08-04 NOTE — DISCHARGE NOTE PROVIDER - NSDCCPCAREPLAN_GEN_ALL_CORE_FT
PRINCIPAL DISCHARGE DIAGNOSIS  Diagnosis: Syncope  Assessment and Plan of Treatment: You were admitted to the hospital because of syncope. You were found to have orthostatic hypotension which is a condition in which blood pressure drops suddenly on changing posture from sitting to standing leading to decreased blood supply to brain. You had a loop recorder implanted to evaluate for any arrhythmias.   You antihypertensive medications have been adjusted.   Stop taking hydrochlorothiazide, take lisinopril 10mg once a day at bedtime.   follow up with your primary care physician, cardiologist and electrophysiology physician within 1 month.   Seek immediate medical attention if you have chest pain, shortness of breath, cough, dizziness, headache, weakness, palpitations.      SECONDARY DISCHARGE DIAGNOSES  Diagnosis: Loss of appetite  Assessment and Plan of Treatment: You were evaluated in the hospital by gastroenterology team for early satiety and anemia which are alarming symptoms. Endoscopy was done during this admission - which showed erosive gastritis; biopsy was taken.   Take frequent small meals, take low fat diet.   Follow up in gastroenterology [facility] clinic - [phone] within 2 weeks to discuss gastric emptying study and to discuss the biopsy reports.   Seek immediate medical attention if you have severe abdominal pain, diarrhea, blood in stool, black stool, severe constipation.    Diagnosis: Hypertension  Assessment and Plan of Treatment: Your antihypertensive medications have been adjusted as above. Please take the medications as prescribed. Follow up with your primary care physician.   Hypertension  Hypertension, commonly called high blood pressure, is when the force of blood pumping through your arteries is too strong. Hypertension forces your heart to work harder to pump blood. Your arteries may become narrow or stiff. Having untreated or uncontrolled hypertension for a long period of time can cause heart attack, stroke, kidney disease, and other problems. If started on a medication, take exactly as prescribed by your health care professional. Maintain a healthy lifestyle and follow up with your primary care physician.  SEEK IMMEDIATE MEDICAL CARE IF YOU HAVE ANY OF THE FOLLOWING SYMPTOMS: severe headache, confusion, chest pain, abdominal pain, vomiting, or shortness of breath.

## 2021-08-04 NOTE — DISCHARGE NOTE PROVIDER - NSDCFUADDAPPT_GEN_ALL_CORE_FT
f/u in EP office with GUILHERME Suárez 8/30/21 12pm  Dr Reyes office  [facility], [address]  [phone].

## 2021-08-04 NOTE — CHART NOTE - NSCHARTNOTEFT_GEN_A_CORE
EGD procedure   Irregularity in the Z-line and gastroesophageal junction.      Erythema in the stomach compatible with non-erosive gastritis. (Biopsy).      Polyps (3 mm to 5 mm) in the fundus.      Nodule in the antrum. (Biopsy).      Normal mucosa in the whole examined duodenum. (Biopsy).       Plan: Advance diet as tolerated  Followup in [facility] after discharge  Await pathology results  recommend gastric emptying study for further evaluation

## 2021-08-04 NOTE — DISCHARGE NOTE NURSING/CASE MANAGEMENT/SOCIAL WORK - NSDCVIVACCINE_GEN_ALL_CORE_FT
SPOKE TO PHARM TO SEE IF HE FILLED THEY STATED PT REQUESTED HIS RX BACK SO THEY GAVE IT TO HIM. I SPOKE WITH PT AND HE IS GOING TO BE BRINGING THE RX BACK TO [facility]. HE IS IN THE PROCESS OF TRYING TO SEE DR DANTE WHITTEN IN [place] SO HE STATED HE DOESN'T NEED ANYTHING ELSE FROM US AT THIS TIME. No Vaccines Administered.

## 2021-08-04 NOTE — DIETITIAN INITIAL EVALUATION ADULT. - PERSON TAUGHT/METHOD
Discussed supplements upon d/c also if appetite does not return to baseline for a short while; encouraged po intake; Re-iterated all HTN/HLD education/verbal instruction/patient instructed

## 2021-08-04 NOTE — DIETITIAN INITIAL EVALUATION ADULT. - ORAL INTAKE PTA/DIET HISTORY
reports good PO/appetite, eat a balanced healthy diet, low salt low fat. Past month however, not able to eat at baseline d/t ongoing symptoms. UBW: 97.7kg vs. wt at admit: vs. various wts at admit vs. RD bedscale wt: 104kg -- unable to calculate wt change at this time. pt think he might've lost some wt at admit however. NKFA. Takes turmeric at home. No MVI. No cul/rel or personal food rest/prefs noted. reports good PO/appetite, eats a balanced healthy diet, low salt low fat. Past month however, not able to eat at baseline d/t ongoing symptoms including nausea and feeling full. UBW: 97.7kg vs. wt at admit: vs. various wts at admit vs. RD bedscale wt: 104kg -- unable to calculate wt change at this time. pt think he might've lost some wt at admit however. NKFA. Takes turmeric at home. No MVI. No cul/rel or personal food rest/prefs noted.

## 2021-08-04 NOTE — PROGRESS NOTE ADULT - SUBJECTIVE AND OBJECTIVE BOX
** This is an incomplete note - pending AM rounds**   JEFF GOODWIN 60y Male  MRN#: [id number]   CODE STATUS:    Hospital Day: 4d    Pt is currently admitted with the primary diagnosis of       SUBJECTIVE  Hospital Course  HPI:  60 year old male with hx of recently diagnosed HTN, HLD, chronic cough since being involved in , GERD presents acutely with an episode of syncope with generalized weakness, decreased PO intake, black stools, and nausea of one month's duration.  Patient states he went to get a glass of water last night and the last thing he remembers is being woken up by his daughter on the ground.  His daughter heard him fall and woke him up immediately.  He thinks he fell on his right side as that's where he's feeling pain.  Of note the EMS told him his blood pressure was 77/40 when he came, his family told him he was sweating.  He was recently diagnosed with hypertension and started on lisinopril after having lower extremity edema with amlodipine.    Patient has also been endorsing nausea for at least a month's duration along with early satiety.  He had an EGD (for chronic cough) 3 years ago and was diagnosed with GERD, he had a colonoscopy at the same time and was told it was clear with benign polyps.  He has been endorsing formed black stools for at least a month's duration as well and has not followed up with GI since, because of his decreased appetite he lost about 7 pounds in that time.    He currently denies chest pain, denies abdominal pain, denies diarrhea.    Triage vitals: BP 99/55  HR 67  RR 20  Temp 97.8  SpO2 98% on room air (2021 12:57)      Overnight events/Subjective complaints        Present Today:   -[place]:  No [  ], Yes [   ]; Indication:     -Type of IV Access:   ----> CVC/Piccline:  No [  ], Yes [   ]; Indication:   ----> Midline: No [  ], Yes [   ];  Indication:                                             ----------------------------------------------------------  OBJECTIVE  PAST MEDICAL & SURGICAL HISTORY  HTN (hypertension)    Hyperlipidemia    No significant past surgical history                                              -----------------------------------------------------------  ALLERGIES:  No Known Allergies                                            ------------------------------------------------------------    HOME MEDICATIONS  Home Medications:  aspirin 81 mg oral tablet, chewable: 1 tab(s) orally once a day (2021 12:56)  atorvastatin 10 mg oral tablet: 1 tab(s) orally once a day (2021 12:56)  lisinopril 10 mg oral tablet: 1 tab(s) orally once a day (2021 12:55)  lisinopril 2.5 mg oral tablet: 1 tab(s) orally once a day (2021 12:55)  omeprazole 40 mg oral delayed release capsule: 1 cap(s) orally once a day (2021 12:56)                           MEDICATIONS:  STANDING MEDICATIONS  aspirin  chewable 81 milliGRAM(s) Oral daily  atorvastatin 10 milliGRAM(s) Oral at bedtime  enoxaparin Injectable 40 milliGRAM(s) SubCutaneous daily  lisinopril 10 milliGRAM(s) Oral at bedtime  pantoprazole    Tablet 40 milliGRAM(s) Oral before breakfast    PRN MEDICATIONS  acetaminophen   Tablet .. 650 milliGRAM(s) Oral every 6 hours PRN                                            ------------------------------------------------------------  VITAL SIGNS: Last 24 Hours  T(C): 36.6 (04 Aug 2021 06:34), Max: 36.6 (04 Aug 2021 05:00)  T(F): 97.9 (04 Aug 2021 06:34), Max: 97.9 (04 Aug 2021 05:00)  HR: 78 (04 Aug 2021 06:34) (74 - 78)  BP: 136/85 (04 Aug 2021 06:34) (118/71 - 136/85)  BP(mean): --  RR: 18 (04 Aug 2021 06:34) (18 - 18)  SpO2: 96% (04 Aug 2021 06:34) (96% - 96%)      21 @ 07:01  -  21 @ 07:00  --------------------------------------------------------  IN: 375 mL / OUT: 0 mL / NET: 375 mL        Daily Height in cm: 190.5 (02 Aug 2021 10:46), Height in cm: 190.5 (2021 09:32), Height in cm: 190.5 (2021 01:00)    Daily Weight in k.6 (02 Aug 2021 05:00), Weight in k.1 (01 Aug 2021 05:11)                                        --------------------------------------------------------------  LABS:                        12.5   8.23  )-----------( 235      ( 03 Aug 2021 20:52 )             36.1                         13.4   7.96  )-----------( 270      ( 03 Aug 2021 07:53 )             39.8                         12.9   6.89  )-----------( 256      ( 02 Aug 2021 04:30 )             37.8     08-03 @ 20:52    135  |  101  |  14  ----------------------------<  104<H>  4.3   |  27  |  1.0  08-03 @ 07:53    140  |  104  |  11  ----------------------------<  96  4.6   |  26  |  1.0   @ 04:30    139  |  107  |  11  ----------------------------<  100<H>  5.0   |  25  |  0.9    Ca    9.1       @ 20:52  Ca    9.6       @ 07:53  Ca    9.5       @ 04:30  Mg     1.9       Mg     2.0       Mg     2.0         TPro  6.8  /  Alb  4.4  /  TBili  0.7  /  DBili  x   /  AST  17  /  ALT  16  /  AlkPhos  83  0803  TPro  6.4  /  Alb  4.1  /  TBili  0.7  /  DBili  x   /  AST  20  /  ALT  16  /  AlkPhos  79  08-02  TPro  6.7  /  Alb  4.3  /  TBili  0.7  /  DBili  x   /  AST  21  /  ALT  15  /  AlkPhos  83  0801    PT/INR - ( 03 Aug 2021 20:52 )   PT: 12.40 sec;   INR: 1.08 ratio         PT/INR - ( 2021 03:29 )   PT: 11.70 sec;   INR: 1.02 ratio         PTT - ( 2021 03:29 )  PTT:27.2 sec      Troponin T, Serum: <0.01 ( @ 07:23)          CARDIAC MARKERS ( 21 @ 07:23 )  x     / <0.01 ng/mL / x     / x     / x      CARDIAC MARKERS ( 21 @ 03:29 )  x     / <0.01 ng/mL / x     / x     / x                                                  -------------------------------------------------------------  RADIOLOGY:                                            --------------------------------------------------------------    PHYSICAL EXAM:  General:   HEENT:  LUNGS:  HEART:  ABDOMEN:  EXT:  NEURO:  SKIN:                                           -------------------------------------------------------------- JEFF GOODWIN 60y Male  MRN#: [id number]   CODE STATUS:    Hospital Day: 4d    Pt is currently admitted with the primary diagnosis of syncope      SUBJECTIVE  Hospital Course  HPI:  60 year old male with hx of recently diagnosed HTN, HLD, chronic cough since being involved in , GERD presents acutely with an episode of syncope with generalized weakness, decreased PO intake, black stools, and nausea of one month's duration.  Patient states he went to get a glass of water last night and the last thing he remembers is being woken up by his daughter on the ground.  His daughter heard him fall and woke him up immediately.  He thinks he fell on his right side as that's where he's feeling pain.  Of note the EMS told him his blood pressure was 77/40 when he came, his family told him he was sweating.  He was recently diagnosed with hypertension and started on lisinopril after having lower extremity edema with amlodipine.    Patient has also been endorsing nausea for at least a month's duration along with early satiety.  He had an EGD (for chronic cough) 3 years ago and was diagnosed with GERD, he had a colonoscopy at the same time and was told it was clear with benign polyps.  He has been endorsing formed black stools for at least a month's duration as well and has not followed up with GI since, because of his decreased appetite he lost about 7 pounds in that time.    He currently denies chest pain, denies abdominal pain, denies diarrhea.    Triage vitals: BP 99/55  HR 67  RR 20  Temp 97.8  SpO2 98% on room air (2021 12:57)      Overnight events/Subjective complaints  Patient was seen at the bedside this morning. He is lying comfortably on the bed. There were no acute events overnight.   He denies any chest pain, shortness of breath, cough, fever, chills, abdominal pain, nausea, vomiting, diarrhea, dizziness or headache.                                               ----------------------------------------------------------  OBJECTIVE  PAST MEDICAL & SURGICAL HISTORY  HTN (hypertension)    Hyperlipidemia    No significant past surgical history                                              -----------------------------------------------------------  ALLERGIES:  No Known Allergies                                            ------------------------------------------------------------    HOME MEDICATIONS  Home Medications:  aspirin 81 mg oral tablet, chewable: 1 tab(s) orally once a day (2021 12:56)  atorvastatin 10 mg oral tablet: 1 tab(s) orally once a day (2021 12:56)  lisinopril 10 mg oral tablet: 1 tab(s) orally once a day (2021 12:55)  lisinopril 2.5 mg oral tablet: 1 tab(s) orally once a day (2021 12:55)  omeprazole 40 mg oral delayed release capsule: 1 cap(s) orally once a day (2021 12:56)                           MEDICATIONS:  STANDING MEDICATIONS  aspirin  chewable 81 milliGRAM(s) Oral daily  atorvastatin 10 milliGRAM(s) Oral at bedtime  enoxaparin Injectable 40 milliGRAM(s) SubCutaneous daily  lisinopril 10 milliGRAM(s) Oral at bedtime  pantoprazole    Tablet 40 milliGRAM(s) Oral before breakfast    PRN MEDICATIONS  acetaminophen   Tablet .. 650 milliGRAM(s) Oral every 6 hours PRN                                            ------------------------------------------------------------  VITAL SIGNS: Last 24 Hours  T(C): 36.6 (04 Aug 2021 06:34), Max: 36.6 (04 Aug 2021 05:00)  T(F): 97.9 (04 Aug 2021 06:34), Max: 97.9 (04 Aug 2021 05:00)  HR: 78 (04 Aug 2021 06:34) (74 - 78)  BP: 136/85 (04 Aug 2021 06:34) (118/71 - 136/85)  BP(mean): --  RR: 18 (04 Aug 2021 06:34) (18 - 18)  SpO2: 96% (04 Aug 2021 06:34) (96% - 96%)      21 @ 07:01  -  21 @ 07:00  --------------------------------------------------------  IN: 375 mL / OUT: 0 mL / NET: 375 mL        Daily Height in cm: 190.5 (02 Aug 2021 10:46), Height in cm: 190.5 (2021 09:32), Height in cm: 190.5 (2021 01:00)    Daily Weight in k.6 (02 Aug 2021 05:00), Weight in k.1 (01 Aug 2021 05:11)                                        --------------------------------------------------------------  LABS:                        12.5   8.23  )-----------( 235      ( 03 Aug 2021 20:52 )             36.1                         13.4   7.96  )-----------( 270      ( 03 Aug 2021 07:53 )             39.8                         12.9   6.89  )-----------( 256      ( 02 Aug 2021 04:30 )             37.8     08-03 @ 20:52    135  |  101  |  14  ----------------------------<  104<H>  4.3   |  27  |  1.0  08-03 @ 07:53    140  |  104  |  11  ----------------------------<  96  4.6   |  26  |  1.0  08- @ 04:30    139  |  107  |  11  ----------------------------<  100<H>  5.0   |  25  |  0.9    Ca    9.1       @ 20:52  Ca    9.6       @ 07:53  Ca    9.5      08 @ 04:30  Mg     1.9       Mg     2.0       Mg     2.0         TPro  6.8  /  Alb  4.4  /  TBili  0.7  /  DBili  x   /  AST  17  /  ALT  16  /  AlkPhos  83    TPro  6.4  /  Alb  4.1  /  TBili  0.7  /  DBili  x   /  AST  20  /  ALT  16  /  AlkPhos  79  08  TPro  6.7  /  Alb  4.3  /  TBili  0.7  /  DBili  x   /  AST  21  /  ALT  15  /  AlkPhos  83      PT/INR - ( 03 Aug 2021 20:52 )   PT: 12.40 sec;   INR: 1.08 ratio         PT/INR - ( 2021 03:29 )   PT: 11.70 sec;   INR: 1.02 ratio         PTT - ( 2021 03:29 )  PTT:27.2 sec      Troponin T, Serum: <0.01 ( @ 07:23)          CARDIAC MARKERS ( 21 @ 07:23 )  x     / <0.01 ng/mL / x     / x     / x      CARDIAC MARKERS ( 21 @ 03:29 )  x     / <0.01 ng/mL / x     / x     / x                                                  -------------------------------------------------------------  RADIOLOGY:                                            --------------------------------------------------------------    PHYSICAL EXAM:  General: No acute distress; Pallor (-), Icterus (-), Cyanosis (-), Clubbing (-)  HEENT: Normocephalic, atraumatic, PERRLA, EOMI  PULM: Bilaterally equal and clear breath sounds, wheeze (-), rubs (-), crackles (-)  CVS: Normal S1 and S2, murmurs (-), rubs (-), gallops (-)   GI: Soft, nondistended, nontender, BS +  MSK: Edema (-), no muscle, bone or joint tenderness noted  SKIN: Warm and well perfused, no rashes noted  NEURO:  Alert and Oriented x 3; No gross focal neurological deficit noted                                           --------------------------------------------------------------

## 2021-08-04 NOTE — DIETITIAN INITIAL EVALUATION ADULT. - PERTINENT MEDS FT
MEDICATIONS  (STANDING):  aspirin  chewable 81 milliGRAM(s) Oral daily  atorvastatin 10 milliGRAM(s) Oral at bedtime  enoxaparin Injectable 40 milliGRAM(s) SubCutaneous daily  lisinopril 10 milliGRAM(s) Oral at bedtime  pantoprazole    Tablet 40 milliGRAM(s) Oral before breakfast

## 2021-08-04 NOTE — DIETITIAN INITIAL EVALUATION ADULT. - OTHER CALCULATIONS
using RD bedscale wt 104kg; Energy: 2316-2509kcal (MSJ 1.2-1.3 AF); protein: 104-125kcal (1-1.2g/kg); Fluid: 1mL/kcal or LIP

## 2021-08-04 NOTE — DIETITIAN INITIAL EVALUATION ADULT. - PHYSCIAL ASSESSMENT
Alert; no edema noted; GI: LBM once since admit per pt, no dark stools anymore however, but nausea continues; Skin: bruised/ecchymotic; will use RD bedscale wt for now as conflicting wts noted

## 2021-08-04 NOTE — DISCHARGE NOTE PROVIDER - HOSPITAL COURSE
60 year old male with hx of recently diagnosed HTN, HLD, chronic cough since being involved in 9/11, GERD presents acutely with an episode of syncope with generalized weakness, decreased PO intake, black stools, and nausea of one month's duration.   Syncope workup was significant for orthostatic hypotension - the patient's hydrochlorothiazide was discontinued and lisinopril was rescheduled to PM. The orthostatics improved after changing the medications. BARBARA was done which showed - BARBARA (08/02): LVEF 50-65%; No cardioembolic source of CVA / TIA was found. A loop recorder was implanted on 08/02. He was evaluated by GI for evaluation of anemia/black stool and early satiety - EGD was done on 08/04 which showed - Irregularity in the Z-line and gastroesophageal junction, Erythema in the stomach compatible with non-erosive gastritis. (Biopsy),  Polyps (3 mm to 5 mm) in the fundus, Nodule in the antrum. (Biopsy), Normal mucosa in the whole examined duodenum. (Biopsy).  Patient has been advised to  Followup in [facility] after discharge for gastric emptying study outpatient.  Patient has been advised to follow up outpatient with GI, Cardiology and EP. 60 year old male with hx of recently diagnosed HTN, HLD, chronic cough since being involved in 9/11, GERD presents acutely with an episode of syncope with generalized weakness, decreased PO intake, black stools, and nausea of one month's duration.   Syncope workup was significant for orthostatic hypotension - the patient's hydrochlorothiazide was discontinued and lisinopril was rescheduled to PM. The orthostatics improved after changing the medications. BARBARA was done which showed - BARBARA (08/02): LVEF 50-65%; No cardioembolic source of CVA / TIA was found. A loop recorder was implanted on 08/02. He was evaluated by GI for evaluation of anemia/black stool and early satiety - EGD was done on 08/04 which showed - Irregularity in the Z-line and gastroesophageal junction, Erythema in the stomach compatible with non-erosive gastritis. (Biopsy),  Polyps (3 mm to 5 mm) in the fundus, Nodule in the antrum. (Biopsy), Normal mucosa in the whole examined duodenum. (Biopsy).  Patient has been advised to  Followup in [facility] after discharge for gastric emptying study outpatient.  Patient has been advised to follow up outpatient with GI, Cardiology and EP.     I have personally seen and examined patient on the above date.  I discussed the case with Dr. Ibarra and I agree with findings and plan as detailed per note above, which I have amended where appropriate.

## 2021-08-04 NOTE — DISCHARGE NOTE PROVIDER - CARE PROVIDER_API CALL
Alejandrina Nicolas  Diagnostic Radiology  [address] [place] [address]  Phone: [phone]  Fax: [phone]  Follow Up Time: 2 weeks    BLAIR VILLALOBOS  [id number]  [address] [place] [address]  Phone: [phone]  Fax: [phone]  Follow Up Time: 2 weeks    Gastroenterology [facility],   [address] [place] [address]  Phone: [phone]  Fax: (   )    -  Follow Up Time: 2 weeks    Juanjose Reyes  Cardiology  [address] [place] [address]  Phone: [phone]  Fax: [phone]  Follow Up Time: 2 weeks

## 2021-08-04 NOTE — PROGRESS NOTE ADULT - ASSESSMENT
60 year old male with hx of recently diagnosed HTN, HLD, chronic cough since being involved in 9/11, GERD presents acutely with an episode of syncope with generalized weakness, decreased PO intake, black stools, and nausea of one month's duration.     # Syncope - Orthostatic hypotension d/t Diuresis and decreased PO intake  - CT head negative, had a syncopal episode in May as well CT head and MRI were negative  - was told that he was sweaty and BP was 77/40 done by EMS at his house right after his fall  - admits to decreased PO intake because of reduced appetite and early satiety of over a month duration / patient has also been having black formed stools  - because of recurrent syncope and sudden collapse, some suspicion for cardiac cause, patient is juaquin in the 50s at rest  - s/p Loop recorder by EP on 08/02  - BARBARA (08/02): LVEF 50-65%; No cardioembolic source of CVA / TIA is identified  - orthostatics VS positive - Repeat orthostatics negative after discontinuing HCTZ and rescheduling lisinopril to PM  - Lyme titer - negative; Follow up TSH    # Normocytic anemia with nausea / early satiety / black formed stools  - Symptoms have been for about a month he states, daughter states it may have been longer, has hx of GERD  - had EGD in 2018, does not remember what findings were / consulted GI due to alarm symptoms, has mild anemia  - f/u retic and iron studies for anemia  - GILBERTO negative in the ED  - GI cs appreciated- plan for EGD today    # HTN  - reschedule lisinopril to PM. d/c HCTZ as per EP recs  - Pt Orthostats improved, continue current meds    # HLD  - c/w atorvastatin 10 qd    # Diet: DASH/TLC  # Activity: IAT  # DVT PPX: Lovenox  # GI PPX: PPI  # Code status: Full code 60 year old male with hx of recently diagnosed HTN, HLD, chronic cough since being involved in 9/11, GERD presents acutely with an episode of syncope with generalized weakness, decreased PO intake, black stools, and nausea of one month's duration.     # Syncope - Orthostatic hypotension d/t Diuresis and decreased PO intake  - CT head negative, had a syncopal episode in May as well CT head and MRI were negative  - was told that he was sweaty and BP was 77/40 done by EMS at his house right after his fall  - admits to decreased PO intake because of reduced appetite and early satiety of over a month duration / patient has also been having black formed stools  - because of recurrent syncope and sudden collapse, some suspicion for cardiac cause, patient is juaquin in the 50s at rest  - s/p Loop recorder by EP on 08/02  - BARBARA (08/02): LVEF 50-65%; No cardioembolic source of CVA / TIA is identified  - orthostatics VS positive - Repeat orthostatics negative after discontinuing HCTZ and rescheduling lisinopril to PM  - Lyme titer - negative; Follow up TSH    # Normocytic anemia with nausea / early satiety / black formed stools  - Symptoms have been for about a month he states, daughter states it may have been longer, has hx of GERD  - had EGD in 2018, does not remember what findings were / consulted GI due to alarm symptoms, has mild anemia  - f/u retic and iron studies for anemia  - GILBERTO negative in the ED  - GI cs appreciated- EGD - Irregularity in the Z-line and gastroesophageal junction, Erythema in the stomach compatible with non-erosive gastritis. (Biopsy),  Polyps (3 mm to 5 mm) in the fundus, Nodule in the antrum. (Biopsy), Normal mucosa in the whole examined duodenum. (Biopsy).    - Followup in [facility] after discharge  - recommend gastric emptying study outpatient    # HTN  - reschedule lisinopril to PM. d/c HCTZ as per EP recs  - Pt Orthostats improved, continue current meds    # HLD  - c/w atorvastatin 10 qd    # Diet: DASH/TLC  # Activity: IAT  # DVT PPX: Lovenox  # GI PPX: PPI  # Code status: Full code

## 2021-08-04 NOTE — PRE-ANESTHESIA EVALUATION ADULT - NSANTHAPLANRD_GEN_ALL_CORE
monitored anesthesia care (MAC)
general/monitored anesthesia care (MAC)
Propranolol Pregnancy And Lactation Text: This medication is Pregnancy Category C and it isn't known if it is safe during pregnancy. It is excreted in breast milk.

## 2021-08-04 NOTE — DISCHARGE NOTE PROVIDER - NSDCMRMEDTOKEN_GEN_ALL_CORE_FT
aspirin 81 mg oral tablet, chewable: 1 tab(s) orally once a day  atorvastatin 10 mg oral tablet: 1 tab(s) orally once a day  lisinopril 10 mg oral tablet: 1 tab(s) orally once a day (at bedtime)   omeprazole 40 mg oral delayed release capsule: 1 cap(s) orally once a day

## 2021-08-04 NOTE — PROGRESS NOTE ADULT - SUBJECTIVE AND OBJECTIVE BOX
Patient is a 60y old  Male who presents with a chief complaint of syncope (04 Aug 2021 06:47)      Patient seen and examined at bedside.  Patient denies any chest pain or shortness of breath.    ALLERGIES:  No Known Allergies    MEDICATIONS:  acetaminophen   Tablet .. 650 milliGRAM(s) Oral every 6 hours PRN  aspirin  chewable 81 milliGRAM(s) Oral daily  atorvastatin 10 milliGRAM(s) Oral at bedtime  enoxaparin Injectable 40 milliGRAM(s) SubCutaneous daily  lisinopril 10 milliGRAM(s) Oral at bedtime  pantoprazole    Tablet 40 milliGRAM(s) Oral before breakfast    Vital Signs Last 24 Hrs  T(F): 96.9 (04 Aug 2021 13:26), Max: 98 (04 Aug 2021 09:14)  HR: 63 (04 Aug 2021 13:26) (63 - 78)  BP: 132/70 (04 Aug 2021 13:26) (87/56 - 157/94)  RR: 18 (04 Aug 2021 13:26) (18 - 20)  SpO2: 99% (04 Aug 2021 10:15) (96% - 99%)  I&O's Summary      PHYSICAL EXAM:  General: NAD, A/O x 3  ENT: MMM  Neck: Supple, No JVD  Lungs: Clear to auscultation bilaterally  Cardio: RRR, S1/S2, No murmurs  Abdomen: Soft, Nontender, Nondistended; Bowel sounds present  Extremities: No cyanosis, No edema    LABS:                        12.5   8.23  )-----------( 235      ( 03 Aug 2021 20:52 )             36.1     08-03    135  |  101  |  14  ----------------------------<  104  4.3   |  27  |  1.0    Ca    9.1      03 Aug 2021 20:52  Mg     1.8     08-04    TPro  6.8  /  Alb  4.4  /  TBili  0.7  /  DBili  x   /  AST  17  /  ALT  16  /  AlkPhos  83  08-03    eGFR if Non African American: 80 mL/min/1.73M2 (08-03-21 @ 20:52)  eGFR if : 93 mL/min/1.73M2 (08-03-21 @ 20:52)    PT/INR - ( 03 Aug 2021 20:52 )   PT: 12.40 sec;   INR: 1.08 ratio                   TSH 0.76   TSH with FT4 reflex --  Total T3 --                      COVID-19 PCR: NotDetec (08-03-21 @ 14:36)  COVID-19 PCR: NotDetec (07-31-21 @ 03:31)      RADIOLOGY & ADDITIONAL TESTS:    Care Discussed with Consultants/Other Providers:

## 2021-08-04 NOTE — PROGRESS NOTE ADULT - PROVIDER SPECIALTY LIST ADULT
Internal Medicine
Electrophysiology
Electrophysiology
Internal Medicine
Hospitalist
Cardiology
Gastroenterology
Internal Medicine
Internal Medicine
Hospitalist

## 2021-08-04 NOTE — DIETITIAN INITIAL EVALUATION ADULT. - OTHER INFO
pertinent medical information:   --60 year old male with hx of recently diagnosed HTN, HLD, chronic cough since being involved in 9/11, GERD presents acutely with an episode of syncope with generalized weakness, decreased PO intake, black stools, and nausea of one month's duration.   # Syncope - Orthostatic hypotension d/t Diuresis and decreased PO intake  # Normocytic anemia with nausea / early satiety / black formed stools- EGD completed 8/4- Symptoms have been for about a month he states, daughter states it may have been longer, has hx of GERD  --Plan: Advance diet as tolerated  --recommend gastric emptying study for further evaluation.    pertinent subjective information: at admit overall intake has been <50%. No chewing/swallowing difficulty reported. pertinent medical information:   --60 year old male with hx of recently diagnosed HTN, HLD, chronic cough since being involved in 9/11, GERD presents acutely with an episode of syncope with generalized weakness, decreased PO intake, black stools, and nausea of one month's duration.   # Syncope - Orthostatic hypotension d/t Diuresis and decreased PO intake  # Normocytic anemia with nausea / early satiety / black formed stools- EGD completed 8/4- Symptoms have been for about a month he states, daughter states it may have been longer, has hx of GERD  --Plan: Advance diet as tolerated  --recommend gastric emptying study for further evaluation.    pertinent subjective information: at admit overall intake has been <50%. No chewing/swallowing difficulty reported. discussed nutritional supplements -- agreeable to add as appropriate. Calorie count due for calculation today, however unable to locate in pt's room. Unable to locate pt's paper chart.

## 2021-08-04 NOTE — PRE-ANESTHESIA EVALUATION ADULT - NSANTHOSAYNRD_GEN_A_CORE
No. IMAN screening performed.  STOP BANG Legend: 0-2 = LOW Risk; 3-4 = INTERMEDIATE Risk; 5-8 = HIGH Risk

## 2021-08-04 NOTE — PROGRESS NOTE ADULT - ASSESSMENT
60 year old male with hx of recently diagnosed HTN, HLD, chronic cough since being involved in 9/11, GERD presents acutely with an episode of syncope with generalized weakness, decreased PO intake, black stools, and nausea of one month's duration.     # Syncope - Orthostatic hypotension d/t Diuresis and decreased PO intake  - CT head negative, had a syncopal episode in May as well CT head and MRI were negative  - patient has also been having black formed stools  - because of recurrent syncope and sudden collapse, some suspicion for cardiac cause, patient is juaquin in the 50s at rest  - s/p Loop recorder by EP on 08/02  - BARBARA (08/02): LVEF 50-65%; No cardioembolic source of CVA / TIA is identified  - orthostatics VS positive - Repeat orthostatics negative after discontinuing HCTZ and rescheduling lisinopril to PM  - Lyme titer - negative; Follow up TSH    # Normocytic anemia with nausea / early satiety / black formed stools  - EGD completed 8/4          Irregularity in the Z-line and gastroesophageal junction.            Erythema in the stomach compatible with non-erosive gastritis. (Biopsy).            Polyps (3 mm to 5 mm) in the fundus.            Nodule in the antrum. (Biopsy).            Normal mucosa in the whole examined duodenum. (Biopsy).    - Symptoms have been for about a month he states, daughter states it may have been longer, has hx of GERD  - had EGD in 2018, does not remember what findings were / consulted GI due to alarm symptoms, has mild anemia  - GILBERTO negative in the ED    # HTN  - reschedule lisinopril to PM. d/c HCTZ as per EP recs  - Pt Orthostats improved, continue current meds    # HLD  - c/w atorvastatin 10 qd    # Diet: DASH/TLC  # Activity: IAT  # DVT PPX: Lovenox  # GI PPX: PPI  # Code status: Full code  Dispo: Plan dc home today

## 2021-08-04 NOTE — DISCHARGE NOTE PROVIDER - CARE PROVIDERS DIRECT ADDRESSES
,DirectAddress_Unknown,DirectAddress_Unknown,DirectAddress_Unknown,[email or web address]

## 2021-08-04 NOTE — DISCHARGE NOTE NURSING/CASE MANAGEMENT/SOCIAL WORK - PATIENT PORTAL LINK FT
You can access the FollowMyHealth Patient Portal offered by [facility] by registering at the following website: [email or web address]. By joining [facility]’s FollowMyHealth portal, you will also be able to view your health information using other applications (apps) compatible with our system.

## 2021-08-06 LAB — SURGICAL PATHOLOGY STUDY: SIGNIFICANT CHANGE UP

## 2021-08-11 PROBLEM — Z00.00 ENCOUNTER FOR PREVENTIVE HEALTH EXAMINATION: Status: ACTIVE | Noted: 2021-08-11

## 2021-08-12 DIAGNOSIS — Z86.16 PERSONAL HISTORY OF COVID-19: ICD-10-CM

## 2021-08-12 DIAGNOSIS — Z80.1 FAMILY HISTORY OF MALIGNANT NEOPLASM OF TRACHEA, BRONCHUS AND LUNG: ICD-10-CM

## 2021-08-12 DIAGNOSIS — J98.4 OTHER DISORDERS OF LUNG: ICD-10-CM

## 2021-08-12 DIAGNOSIS — E86.9 VOLUME DEPLETION, UNSPECIFIED: ICD-10-CM

## 2021-08-12 DIAGNOSIS — K29.00 ACUTE GASTRITIS WITHOUT BLEEDING: ICD-10-CM

## 2021-08-12 DIAGNOSIS — I95.1 ORTHOSTATIC HYPOTENSION: ICD-10-CM

## 2021-08-12 DIAGNOSIS — Z82.49 FAMILY HISTORY OF ISCHEMIC HEART DISEASE AND OTHER DISEASES OF THE CIRCULATORY SYSTEM: ICD-10-CM

## 2021-08-12 DIAGNOSIS — K21.9 GASTRO-ESOPHAGEAL REFLUX DISEASE WITHOUT ESOPHAGITIS: ICD-10-CM

## 2021-08-12 DIAGNOSIS — R55 SYNCOPE AND COLLAPSE: ICD-10-CM

## 2021-08-12 DIAGNOSIS — R00.1 BRADYCARDIA, UNSPECIFIED: ICD-10-CM

## 2021-08-12 DIAGNOSIS — I10 ESSENTIAL (PRIMARY) HYPERTENSION: ICD-10-CM

## 2021-08-12 DIAGNOSIS — M19.90 UNSPECIFIED OSTEOARTHRITIS, UNSPECIFIED SITE: ICD-10-CM

## 2021-08-12 DIAGNOSIS — D64.9 ANEMIA, UNSPECIFIED: ICD-10-CM

## 2021-08-12 DIAGNOSIS — R63.0 ANOREXIA: ICD-10-CM

## 2021-08-12 DIAGNOSIS — K31.7 POLYP OF STOMACH AND DUODENUM: ICD-10-CM

## 2021-08-12 DIAGNOSIS — R68.81 EARLY SATIETY: ICD-10-CM

## 2021-08-12 DIAGNOSIS — E78.5 HYPERLIPIDEMIA, UNSPECIFIED: ICD-10-CM

## 2021-08-12 DIAGNOSIS — R53.1 WEAKNESS: ICD-10-CM

## 2021-08-23 ENCOUNTER — NON-APPOINTMENT (OUTPATIENT)
Age: 61
End: 2021-08-23

## 2021-08-30 ENCOUNTER — APPOINTMENT (OUTPATIENT)
Dept: CARDIOLOGY | Facility: CLINIC | Age: 61
End: 2021-08-30
Payer: COMMERCIAL

## 2021-08-30 VITALS
TEMPERATURE: 97.9 F | BODY MASS INDEX: 26.36 KG/M2 | HEART RATE: 68 BPM | SYSTOLIC BLOOD PRESSURE: 113 MMHG | DIASTOLIC BLOOD PRESSURE: 75 MMHG | HEIGHT: 75 IN | WEIGHT: 212 LBS

## 2021-08-30 DIAGNOSIS — Z78.9 OTHER SPECIFIED HEALTH STATUS: ICD-10-CM

## 2021-08-30 DIAGNOSIS — Z95.818 PRESENCE OF OTHER CARDIAC IMPLANTS AND GRAFTS: ICD-10-CM

## 2021-08-30 DIAGNOSIS — I10 ESSENTIAL (PRIMARY) HYPERTENSION: ICD-10-CM

## 2021-08-30 DIAGNOSIS — E78.5 HYPERLIPIDEMIA, UNSPECIFIED: ICD-10-CM

## 2021-08-30 DIAGNOSIS — R55 SYNCOPE AND COLLAPSE: ICD-10-CM

## 2021-08-30 PROCEDURE — 93298 REM INTERROG DEV EVAL SCRMS: CPT

## 2021-08-30 RX ORDER — LISINOPRIL 10 MG/1
10 TABLET ORAL
Qty: 30 | Refills: 0 | Status: ACTIVE | COMMUNITY
Start: 2021-06-01

## 2021-08-30 RX ORDER — ATORVASTATIN CALCIUM 10 MG/1
10 TABLET, FILM COATED ORAL
Qty: 90 | Refills: 0 | Status: ACTIVE | COMMUNITY
Start: 2021-06-08

## 2021-08-30 RX ORDER — ASPIRIN 81 MG/1
81 TABLET, CHEWABLE ORAL
Qty: 30 | Refills: 0 | Status: ACTIVE | COMMUNITY
Start: 2021-05-07

## 2021-08-30 NOTE — HISTORY OF PRESENT ILLNESS
[de-identified] : cardiologist: Dr Nicolas \par PCP: Haritha \par \par 60 year old male with hx of recently diagnosed HTN, HLD, chronic cough since being involved in 9/11, GERD He presented to [facility] for recurrent episodes of syncope with \par generalized weakness, decreased PO intake, black stools, and nausea of one \par month's duration.  EGD completed 8/4:  Irregularity in the Z-line and gastroesophageal junction.  Erythema in the stomach compatible with non-erosive gastritis. Polyps (3 mm to 5 mm) in the fundus.\par Nodule in the antrum. (Biopsy).  Normal mucosa in the whole examined duodenum. (Biopsy).\par \par Syncope workup was significant for orthostatic hypotension - the patient's\par hydrochlorothiazide was discontinued and lisinopril was rescheduled to PM. The\par orthostatics improved after changing the medications. \par BARBARA (08/02): LVEF 50-65%; No cardioembolic source of CVA / TIA was found.\par He underwent an ILR on 8/4/2021 for long term arrhythmia monitoring. \par Returning for follow up/ wound check/ device interrogation. \par \par \par \par \par

## 2021-08-30 NOTE — PHYSICAL EXAM
[General Appearance - Well Developed] : well developed [General Appearance - Well Nourished] : well nourished [Heart Rate And Rhythm] : heart rate and rhythm were normal [Heart Sounds] : normal S1 and S2 [Arterial Pulses Normal] : the arterial pulses were normal [] : no respiratory distress [Respiration, Rhythm And Depth] : normal respiratory rhythm and effort [Auscultation Breath Sounds / Voice Sounds] : lungs were clear to auscultation bilaterally [Clean] : clean [Dry] : dry [Well-Healed] : well-healed [Bowel Sounds] : normal bowel sounds [Abdomen Soft] : soft [Nail Clubbing] : no clubbing of the fingernails [Cyanosis, Localized] : no localized cyanosis

## 2021-08-30 NOTE — PROCEDURE
[No] : not [See Scanned Paceart Report] : See scanned paceart report [See Device Printout] : See device printout [Voltage: ___ volts] : Voltage was [unfilled] volts [None] : none [de-identified] : Reveal LINQ 11 [de-identified] : R [de-identified] : [id number] [de-identified] : 8/4/2021 [de-identified] : NO events

## 2021-09-09 ENCOUNTER — APPOINTMENT (OUTPATIENT)
Dept: CARDIOLOGY | Facility: CLINIC | Age: 61
End: 2021-09-09
Payer: COMMERCIAL

## 2021-09-09 ENCOUNTER — NON-APPOINTMENT (OUTPATIENT)
Age: 61
End: 2021-09-09

## 2021-09-09 PROCEDURE — G2066: CPT | Mod: NC

## 2021-09-09 PROCEDURE — 93298 REM INTERROG DEV EVAL SCRMS: CPT | Mod: NC

## 2021-10-01 ENCOUNTER — NON-APPOINTMENT (OUTPATIENT)
Age: 61
End: 2021-10-01

## 2021-10-01 ENCOUNTER — APPOINTMENT (OUTPATIENT)
Age: 61
End: 2021-10-01
Payer: COMMERCIAL

## 2021-10-01 ENCOUNTER — OUTPATIENT (OUTPATIENT)
Dept: OUTPATIENT SERVICES | Facility: HOSPITAL | Age: 61
LOS: 1 days | Discharge: HOME | End: 2021-10-01

## 2021-10-01 VITALS
OXYGEN SATURATION: 98 % | WEIGHT: 212 LBS | BODY MASS INDEX: 26.36 KG/M2 | HEART RATE: 66 BPM | SYSTOLIC BLOOD PRESSURE: 114 MMHG | TEMPERATURE: 97 F | HEIGHT: 75 IN | DIASTOLIC BLOOD PRESSURE: 80 MMHG

## 2021-10-01 DIAGNOSIS — Z86.010 PERSONAL HISTORY OF COLONIC POLYPS: ICD-10-CM

## 2021-10-01 DIAGNOSIS — K22.70 BARRETT'S ESOPHAGUS W/OUT DYSPLASIA: ICD-10-CM

## 2021-10-01 DIAGNOSIS — R68.81 EARLY SATIETY: ICD-10-CM

## 2021-10-01 PROCEDURE — 99214 OFFICE O/P EST MOD 30 MIN: CPT

## 2021-10-01 NOTE — END OF VISIT
[] : Fellow [FreeTextEntry3] : 61-year-old man history of early satiety and bloating status post EGD and patient which revealed gastritis negative HP.  Patient was supposed to get a Nuclear gastric emptying study but was discharged prior to getting it.  Patient last screening colonoscopy was in 2018 at an outpatient facility; reportedly had one polyp.  We will schedule a colonoscopy for surveillance and obtain the gastric emptying study.  Patient will follow up afterwards.

## 2021-10-01 NOTE — HISTORY OF PRESENT ILLNESS
[Heartburn] : denies heartburn [Nausea] : stable nausea [Vomiting] : denies vomiting [Diarrhea] : denies diarrhea [Constipation] : denies constipation [Yellow Skin Or Eyes (Jaundice)] : denies jaundice [Abdominal Pain] : denies abdominal pain [Abdominal Swelling] : denies abdominal swelling [Rectal Pain] : denies rectal pain [de-identified] : 61 year old male with hx of recently diagnosed HTN, HLD, chronic cough since being involved in 9/11, GERD He presented to [facility]/ [place] for recurrent episodes of syncope with \par generalized weakness, decreased PO intake, black stools, and nausea of one \par month's duration. EGD completed 8/4: Irregularity in the Z-line and gastroesophageal junction ( BE in biopsy no dysplasia). Erythema in the stomach compatible with non-erosive gastritis. Polyps (3 mm to 5 mm) in the fundus ( No HP). Nodule in the antrum. (Biopsy, gastric mucosa)). Normal mucosa in the whole examined duodenum. (Biopsy, normal).\par He states can tolerate bland diet but still has early satiety and fulness. denies dysphagia \par \par Syncope workup was significant for orthostatic hypotension - the patient's hydrochlorothiazide was discontinued and lisinopril was rescheduled to PM. The orthostatics improved after changing the medications. \par BARBARA (08/02): LVEF 50-65%; No cardioembolic source of CVA / TIA was found.\par He underwent an ILR on 8/4/2021 for long term arrhythmia monitoring. \par \par \par Colonoscopy 3 years ago as per patient by Dr. Doherty and one small polyp removed.

## 2021-10-01 NOTE — ASSESSMENT
[FreeTextEntry1] : 61 year old male with hx of recently diagnosed HTN, HLD, chronic cough since being involved in 9/11, GERD He presented to [facility]/ [place] for recurrent episodes of syncope with generalized weakness, decreased PO intake, black stools, and nausea of one month's duration. \par \par # Alejandra's esophagus \par EGD completed 8/4: Irregularity in the Z-line and gastroesophageal junction ( BE in biopsy no dysplasia). Erythema in the stomach compatible with non-erosive gastritis. Polyps (3 mm to 5 mm) in the fundus ( No HP). Nodule in the antrum. (Biopsy, gastric mucosa)). Normal mucosa in the whole examined duodenum. (Biopsy, normal).\par He states can tolerate bland diet but still has early satiety and fulness. denies dysphagia \par \par \par #early satiety:\par - gain weight after discharge\par - Not diabetic\par \par # CRC screening:\par -Colonoscopy 3 years ago as per patient by Dr. Doherty and one small polyp removed.\par \par \par Rec:\par - MICHAEL\par - c/w PPI\par - repeat EGD in 3 -5 yrs\par - colonoscopy ( he was seen by cardiologist and was cleared for foot surgery)

## 2021-10-01 NOTE — HISTORY OF PRESENT ILLNESS
[Heartburn] : denies heartburn [Nausea] : stable nausea [Vomiting] : denies vomiting [Diarrhea] : denies diarrhea [Constipation] : denies constipation [Yellow Skin Or Eyes (Jaundice)] : denies jaundice [Abdominal Pain] : denies abdominal pain [Abdominal Swelling] : denies abdominal swelling [Rectal Pain] : denies rectal pain [de-identified] : 61 year old male with hx of recently diagnosed HTN, HLD, chronic cough since being involved in 9/11, GERD He presented to [facility]/ [place] for recurrent episodes of syncope with \par generalized weakness, decreased PO intake, black stools, and nausea of one \par month's duration. EGD completed 8/4: Irregularity in the Z-line and gastroesophageal junction ( BE in biopsy no dysplasia). Erythema in the stomach compatible with non-erosive gastritis. Polyps (3 mm to 5 mm) in the fundus ( No HP). Nodule in the antrum. (Biopsy, gastric mucosa)). Normal mucosa in the whole examined duodenum. (Biopsy, normal).\par He states can tolerate bland diet but still has early satiety and fulness. denies dysphagia \par \par Syncope workup was significant for orthostatic hypotension - the patient's hydrochlorothiazide was discontinued and lisinopril was rescheduled to PM. The orthostatics improved after changing the medications. \par BARBARA (08/02): LVEF 50-65%; No cardioembolic source of CVA / TIA was found.\par He underwent an ILR on 8/4/2021 for long term arrhythmia monitoring. \par \par \par Colonoscopy 3 years ago as per patient by Dr. Doherty and one small polyp removed.

## 2021-10-01 NOTE — ASSESSMENT
[FreeTextEntry1] : 61 year old male with hx of recently diagnosed HTN, HLD, chronic cough since being involved in 9/11, GERD He presented to [facility]/ [place] for recurrent episodes of syncope with generalized weakness, decreased PO intake, black stools, and nausea of one month's duration. \par \par # Alejandra's esophagus \par EGD completed 8/4: Irregularity in the Z-line and gastroesophageal junction ( BE in biopsy no dysplasia). Erythema in the stomach compatible with non-erosive gastritis. Polyps (3 mm to 5 mm) in the fundus ( No HP). Nodule in the antrum. (Biopsy, gastric mucosa)). Normal mucosa in the whole examined duodenum. (Biopsy, normal).\par He states can tolerate bland diet but still has early satiety and fulness. denies dysphagia \par \par \par #early satiety:\par - gain weight after discharge\par - Not diabetic\par \par # CRC screening:\par -Colonoscopy 3 years ago as per patient by Dr. Doherty and one small polyp removed.\par \par \par Rec:\par - MICHAEL\par - c/w PPI\par - repeat EGD in 3 -5 yrs\par - colonoscopy ( he was seen by cardiologist and was cleared for foot surgery)

## 2021-10-05 DIAGNOSIS — R68.81 EARLY SATIETY: ICD-10-CM

## 2021-10-05 DIAGNOSIS — Z86.010 PERSONAL HISTORY OF COLONIC POLYPS: ICD-10-CM

## 2021-10-05 DIAGNOSIS — K22.70 BARRETT'S ESOPHAGUS WITHOUT DYSPLASIA: ICD-10-CM

## 2021-10-11 ENCOUNTER — OUTPATIENT (OUTPATIENT)
Dept: OUTPATIENT SERVICES | Facility: HOSPITAL | Age: 61
LOS: 1 days | Discharge: HOME | End: 2021-10-11

## 2021-10-11 ENCOUNTER — LABORATORY RESULT (OUTPATIENT)
Age: 61
End: 2021-10-11

## 2021-10-11 DIAGNOSIS — Z11.59 ENCOUNTER FOR SCREENING FOR OTHER VIRAL DISEASES: ICD-10-CM

## 2021-10-14 ENCOUNTER — OUTPATIENT (OUTPATIENT)
Dept: OUTPATIENT SERVICES | Facility: HOSPITAL | Age: 61
LOS: 1 days | Discharge: HOME | End: 2021-10-14
Payer: COMMERCIAL

## 2021-10-14 ENCOUNTER — RESULT REVIEW (OUTPATIENT)
Age: 61
End: 2021-10-14

## 2021-10-14 ENCOUNTER — NON-APPOINTMENT (OUTPATIENT)
Age: 61
End: 2021-10-14

## 2021-10-14 ENCOUNTER — APPOINTMENT (OUTPATIENT)
Dept: CARDIOLOGY | Facility: CLINIC | Age: 61
End: 2021-10-14
Payer: COMMERCIAL

## 2021-10-14 DIAGNOSIS — R68.81 EARLY SATIETY: ICD-10-CM

## 2021-10-14 PROCEDURE — 93298 REM INTERROG DEV EVAL SCRMS: CPT | Mod: NC

## 2021-10-14 PROCEDURE — 78264 GASTRIC EMPTYING IMG STUDY: CPT | Mod: 26

## 2021-10-14 PROCEDURE — G2066: CPT | Mod: NC

## 2021-11-17 ENCOUNTER — NON-APPOINTMENT (OUTPATIENT)
Age: 61
End: 2021-11-17

## 2021-11-18 ENCOUNTER — NON-APPOINTMENT (OUTPATIENT)
Age: 61
End: 2021-11-18

## 2021-11-18 ENCOUNTER — APPOINTMENT (OUTPATIENT)
Dept: CARDIOLOGY | Facility: CLINIC | Age: 61
End: 2021-11-18
Payer: COMMERCIAL

## 2021-11-18 PROCEDURE — G2066: CPT | Mod: NC

## 2021-11-18 PROCEDURE — 93298 REM INTERROG DEV EVAL SCRMS: CPT

## 2022-01-18 RX ORDER — OMEPRAZOLE 40 MG/1
40 CAPSULE, DELAYED RELEASE ORAL
Qty: 30 | Refills: 5 | Status: ACTIVE | COMMUNITY

## 2022-01-18 RX ORDER — POLYETHYLENE GLYCOL 3350 17 G/17G
17 POWDER, FOR SOLUTION ORAL
Qty: 1 | Refills: 0 | Status: ACTIVE | COMMUNITY
Start: 2021-10-01 | End: 1900-01-01

## 2022-01-20 ENCOUNTER — APPOINTMENT (OUTPATIENT)
Dept: CARDIOLOGY | Facility: CLINIC | Age: 62
End: 2022-01-20
Payer: COMMERCIAL

## 2022-01-20 ENCOUNTER — NON-APPOINTMENT (OUTPATIENT)
Age: 62
End: 2022-01-20

## 2022-01-20 PROCEDURE — 93298 REM INTERROG DEV EVAL SCRMS: CPT

## 2022-01-20 PROCEDURE — G2066: CPT

## 2022-01-24 ENCOUNTER — APPOINTMENT (OUTPATIENT)
Dept: CARDIOLOGY | Facility: CLINIC | Age: 62
End: 2022-01-24

## 2022-02-04 ENCOUNTER — APPOINTMENT (OUTPATIENT)
Dept: GASTROENTEROLOGY | Facility: CLINIC | Age: 62
End: 2022-02-04

## 2022-02-24 ENCOUNTER — NON-APPOINTMENT (OUTPATIENT)
Age: 62
End: 2022-02-24

## 2022-02-24 ENCOUNTER — APPOINTMENT (OUTPATIENT)
Dept: CARDIOLOGY | Facility: CLINIC | Age: 62
End: 2022-02-24
Payer: COMMERCIAL

## 2022-02-24 PROCEDURE — G2066: CPT

## 2022-02-24 PROCEDURE — 93298 REM INTERROG DEV EVAL SCRMS: CPT

## 2022-03-02 ENCOUNTER — NON-APPOINTMENT (OUTPATIENT)
Age: 62
End: 2022-03-02

## 2022-03-31 ENCOUNTER — NON-APPOINTMENT (OUTPATIENT)
Age: 62
End: 2022-03-31

## 2022-03-31 ENCOUNTER — APPOINTMENT (OUTPATIENT)
Dept: CARDIOLOGY | Facility: CLINIC | Age: 62
End: 2022-03-31
Payer: COMMERCIAL

## 2022-03-31 PROCEDURE — G2066: CPT

## 2022-03-31 PROCEDURE — 93298 REM INTERROG DEV EVAL SCRMS: CPT

## 2022-05-05 ENCOUNTER — APPOINTMENT (OUTPATIENT)
Dept: CARDIOLOGY | Facility: CLINIC | Age: 62
End: 2022-05-05
Payer: COMMERCIAL

## 2022-05-05 ENCOUNTER — NON-APPOINTMENT (OUTPATIENT)
Age: 62
End: 2022-05-05

## 2022-05-05 PROCEDURE — 93298 REM INTERROG DEV EVAL SCRMS: CPT

## 2022-05-05 PROCEDURE — G2066: CPT

## 2022-06-09 ENCOUNTER — NON-APPOINTMENT (OUTPATIENT)
Age: 62
End: 2022-06-09

## 2022-06-09 ENCOUNTER — APPOINTMENT (OUTPATIENT)
Dept: CARDIOLOGY | Facility: CLINIC | Age: 62
End: 2022-06-09
Payer: COMMERCIAL

## 2022-06-09 PROCEDURE — 93298 REM INTERROG DEV EVAL SCRMS: CPT

## 2022-06-09 PROCEDURE — G2066: CPT

## 2022-07-13 ENCOUNTER — NON-APPOINTMENT (OUTPATIENT)
Age: 62
End: 2022-07-13

## 2022-07-13 ENCOUNTER — APPOINTMENT (OUTPATIENT)
Dept: CARDIOLOGY | Facility: CLINIC | Age: 62
End: 2022-07-13

## 2022-07-13 PROCEDURE — 93298 REM INTERROG DEV EVAL SCRMS: CPT

## 2022-07-13 PROCEDURE — G2066: CPT

## 2022-08-17 ENCOUNTER — APPOINTMENT (OUTPATIENT)
Dept: CARDIOLOGY | Facility: CLINIC | Age: 62
End: 2022-08-17

## 2023-03-27 NOTE — DISCHARGE NOTE NURSING/CASE MANAGEMENT/SOCIAL WORK - NSTRANSFERBELONGINGSDISPO_GEN_A_NUR
[PPS Score: ____] : Palliative Performance Scale (PPS) Score: [unfilled] [de-identified] : none [de-identified] : pureed food not applicable

## 2024-02-23 NOTE — ED ADULT NURSE NOTE - NS ED NURSE LEVEL OF CONSCIOUSNESS ORIENTATION
Oriented - self; Oriented - place; Oriented - time No. HAROLDO screening performed.  STOP BANG Legend: 0-2 = LOW Risk; 3-4 = INTERMEDIATE Risk; 5-8 = HIGH Risk

## 2024-12-09 NOTE — ED PROVIDER NOTE - PR
Detail Level: Generalized When Should The Patient Follow-Up For Their Next Full-Body Skin Exam?: 3 Months Quality 137: Melanoma: Continuity Of Care - Recall System: Patient information entered into a recall system that includes: target date for the next exam specified AND a process to follow up with patients regarding missed or unscheduled appointments Detail Level: Detailed 136

## 2025-05-07 NOTE — ASSESSMENT
[FreeTextEntry1] : # Syncope - Orthostatic hypotension d/t Diuresis and decreased PO intake\par - CT head negative, had a syncopal episode in May as well CT head and MRI were\par negative \par -s/p ILR on 8/4/2021 for long term arrhythmia monitoring \par -loop recorder interrogation: normal function, no events \par -Enrolled in remote monitoring and transmitting appropriately. Loop recorder function, transmission time, frequency and importance to keep home transmitter connected at all times discussed in details. \par -continue adequate po hydration \par \par \par # HTN- well controlled\par -no recurrent syncope since hospital discharge\par -continue current medications \par -RTC in 4 months \par 
clinic [facility]  eps  dr howell interrogation for icd uploaded an in allscripts  4/10/25 and note 1/9/25 " ok to proceed with prostate bx"